# Patient Record
Sex: MALE | Race: WHITE | Employment: OTHER | URBAN - METROPOLITAN AREA
[De-identification: names, ages, dates, MRNs, and addresses within clinical notes are randomized per-mention and may not be internally consistent; named-entity substitution may affect disease eponyms.]

---

## 2018-09-05 ENCOUNTER — HOSPITAL ENCOUNTER (OUTPATIENT)
Dept: NON INVASIVE DIAGNOSTICS | Age: 58
Discharge: HOME | End: 2018-09-05
Payer: COMMERCIAL

## 2018-09-05 LAB
EKG ATRIAL RATE: 62 BPM
EKG P AXIS: 27 DEGREES
EKG P-R INTERVAL: 120 MS
EKG Q-T INTERVAL: 414 MS
EKG QRS DURATION: 88 MS
EKG QTC CALCULATION (BAZETT): 420 MS
EKG R AXIS: 73 DEGREES
EKG T AXIS: 67 DEGREES
EKG VENTRICULAR RATE: 62 BPM

## 2018-09-05 PROCEDURE — 93005 ELECTROCARDIOGRAM TRACING: CPT

## 2018-09-06 PROCEDURE — 93010 ELECTROCARDIOGRAM REPORT: CPT | Performed by: INTERNAL MEDICINE

## 2019-06-12 ENCOUNTER — HOSPITAL ENCOUNTER (OUTPATIENT)
Dept: NON INVASIVE DIAGNOSTICS | Age: 59
Discharge: HOME | End: 2019-06-12
Payer: COMMERCIAL

## 2019-06-12 LAB
EKG ATRIAL RATE: 65 BPM
EKG P AXIS: 79 DEGREES
EKG P-R INTERVAL: 132 MS
EKG Q-T INTERVAL: 444 MS
EKG QRS DURATION: 92 MS
EKG QTC CALCULATION (BAZETT): 461 MS
EKG R AXIS: 74 DEGREES
EKG T AXIS: 71 DEGREES
EKG VENTRICULAR RATE: 65 BPM

## 2019-06-12 PROCEDURE — 93005 ELECTROCARDIOGRAM TRACING: CPT | Performed by: NURSE PRACTITIONER

## 2019-06-14 PROCEDURE — 93010 ELECTROCARDIOGRAM REPORT: CPT | Performed by: INTERNAL MEDICINE

## 2020-04-22 ENCOUNTER — HOSPITAL ENCOUNTER (OUTPATIENT)
Dept: GENERAL RADIOLOGY | Age: 60
Discharge: HOME | End: 2020-04-24
Payer: COMMERCIAL

## 2020-04-22 ENCOUNTER — HOSPITAL ENCOUNTER (OUTPATIENT)
Dept: ULTRASOUND IMAGING | Age: 60
Discharge: HOME | End: 2020-04-24
Payer: COMMERCIAL

## 2020-04-22 PROCEDURE — 71046 X-RAY EXAM CHEST 2 VIEWS: CPT

## 2020-04-22 PROCEDURE — 76999 ECHO EXAMINATION PROCEDURE: CPT

## 2020-05-04 ENCOUNTER — HOSPITAL ENCOUNTER (EMERGENCY)
Age: 60
Discharge: HOME | End: 2020-05-04
Payer: COMMERCIAL

## 2020-05-04 VITALS
RESPIRATION RATE: 16 BRPM | DIASTOLIC BLOOD PRESSURE: 84 MMHG | WEIGHT: 103 LBS | TEMPERATURE: 98.3 F | BODY MASS INDEX: 16.55 KG/M2 | OXYGEN SATURATION: 100 % | SYSTOLIC BLOOD PRESSURE: 120 MMHG | HEIGHT: 66 IN | HEART RATE: 80 BPM

## 2020-05-04 PROCEDURE — 6370000000 HC RX 637 (ALT 250 FOR IP): Performed by: PHYSICIAN ASSISTANT

## 2020-05-04 PROCEDURE — 2580000003 HC RX 258: Performed by: PHYSICIAN ASSISTANT

## 2020-05-04 PROCEDURE — 2500000003 HC RX 250 WO HCPCS: Performed by: PHYSICIAN ASSISTANT

## 2020-05-04 PROCEDURE — 10060 I&D ABSCESS SIMPLE/SINGLE: CPT

## 2020-05-04 PROCEDURE — 99282 EMERGENCY DEPT VISIT SF MDM: CPT

## 2020-05-04 RX ORDER — LIDOCAINE HYDROCHLORIDE 10 MG/ML
5 INJECTION, SOLUTION EPIDURAL; INFILTRATION; INTRACAUDAL; PERINEURAL ONCE
Status: COMPLETED | OUTPATIENT
Start: 2020-05-04 | End: 2020-05-04

## 2020-05-04 RX ORDER — CEPHALEXIN 500 MG/1
500 CAPSULE ORAL 3 TIMES DAILY
Qty: 30 CAPSULE | Refills: 0 | Status: ON HOLD | OUTPATIENT
Start: 2020-05-04 | End: 2020-06-02 | Stop reason: HOSPADM

## 2020-05-04 RX ORDER — ACETAMINOPHEN 500 MG
500 TABLET ORAL EVERY 6 HOURS PRN
Qty: 20 TABLET | Refills: 0 | Status: SHIPPED | OUTPATIENT
Start: 2020-05-04

## 2020-05-04 RX ORDER — MAGNESIUM HYDROXIDE 1200 MG/15ML
250 LIQUID ORAL CONTINUOUS
Status: DISCONTINUED | OUTPATIENT
Start: 2020-05-04 | End: 2020-05-04 | Stop reason: HOSPADM

## 2020-05-04 RX ORDER — ACETAMINOPHEN 325 MG/1
650 TABLET ORAL ONCE
Status: COMPLETED | OUTPATIENT
Start: 2020-05-04 | End: 2020-05-04

## 2020-05-04 RX ORDER — CEPHALEXIN 500 MG/1
500 CAPSULE ORAL ONCE
Status: COMPLETED | OUTPATIENT
Start: 2020-05-04 | End: 2020-05-04

## 2020-05-04 RX ADMIN — LIDOCAINE HYDROCHLORIDE 5 ML: 10 INJECTION, SOLUTION EPIDURAL; INFILTRATION; INTRACAUDAL; PERINEURAL at 13:41

## 2020-05-04 RX ADMIN — ACETAMINOPHEN 650 MG: 325 TABLET ORAL at 13:40

## 2020-05-04 RX ADMIN — SODIUM CHLORIDE 250 ML: 900 IRRIGANT IRRIGATION at 13:41

## 2020-05-04 RX ADMIN — CEPHALEXIN 500 MG: 500 CAPSULE ORAL at 13:40

## 2020-05-04 ASSESSMENT — PAIN DESCRIPTION - FREQUENCY: FREQUENCY: CONTINUOUS

## 2020-05-04 ASSESSMENT — PAIN DESCRIPTION - ORIENTATION: ORIENTATION: RIGHT

## 2020-05-04 ASSESSMENT — ENCOUNTER SYMPTOMS
COLOR CHANGE: 1
RESPIRATORY NEGATIVE: 1
EYES NEGATIVE: 1
GASTROINTESTINAL NEGATIVE: 1
ROS SKIN COMMENTS: SKIN LESION

## 2020-05-04 ASSESSMENT — PAIN DESCRIPTION - LOCATION: LOCATION: ABDOMEN

## 2020-05-04 ASSESSMENT — PAIN DESCRIPTION - ONSET: ONSET: PROGRESSIVE

## 2020-05-04 ASSESSMENT — PAIN DESCRIPTION - PAIN TYPE: TYPE: ACUTE PAIN

## 2020-05-04 ASSESSMENT — PAIN DESCRIPTION - DESCRIPTORS: DESCRIPTORS: TENDER

## 2020-05-04 ASSESSMENT — PAIN SCALES - GENERAL: PAINLEVEL_OUTOF10: 4

## 2020-05-04 NOTE — ED NOTES
Abscess cleansed with chlorhexadine and band aid applied. Pt tolerated well. Pt and patient's  advised of patient's medications that need picked up and given discharge instructions. Verbalized understanding. Ambulatory out of er with steady gait.       Maritza Royal RN  05/04/20 8533

## 2020-05-15 ENCOUNTER — OFFICE VISIT (OUTPATIENT)
Dept: SURGERY | Age: 60
End: 2020-05-15
Payer: COMMERCIAL

## 2020-05-15 VITALS
HEIGHT: 66 IN | WEIGHT: 101 LBS | TEMPERATURE: 97.1 F | DIASTOLIC BLOOD PRESSURE: 70 MMHG | BODY MASS INDEX: 16.23 KG/M2 | SYSTOLIC BLOOD PRESSURE: 110 MMHG

## 2020-05-15 PROCEDURE — G8427 DOCREV CUR MEDS BY ELIG CLIN: HCPCS | Performed by: SURGERY

## 2020-05-15 PROCEDURE — G8419 CALC BMI OUT NRM PARAM NOF/U: HCPCS | Performed by: SURGERY

## 2020-05-15 PROCEDURE — 10061 I&D ABSCESS COMP/MULTIPLE: CPT | Performed by: SURGERY

## 2020-05-15 PROCEDURE — 4004F PT TOBACCO SCREEN RCVD TLK: CPT | Performed by: SURGERY

## 2020-05-15 PROCEDURE — 3017F COLORECTAL CA SCREEN DOC REV: CPT | Performed by: SURGERY

## 2020-05-15 PROCEDURE — 99202 OFFICE O/P NEW SF 15 MIN: CPT | Performed by: SURGERY

## 2020-05-15 RX ORDER — PHENYTOIN SODIUM 100 MG/1
CAPSULE, EXTENDED RELEASE ORAL
Status: ON HOLD | COMMUNITY
Start: 2020-03-13 | End: 2020-05-30 | Stop reason: HOSPADM

## 2020-05-15 RX ORDER — DIVALPROEX SODIUM 500 MG/1
TABLET, DELAYED RELEASE ORAL
Status: ON HOLD | COMMUNITY
Start: 2020-03-18 | End: 2020-05-30 | Stop reason: HOSPADM

## 2020-05-15 RX ORDER — DIPHENOXYLATE HYDROCHLORIDE AND ATROPINE SULFATE 2.5; .025 MG/1; MG/1
TABLET ORAL
Status: ON HOLD | COMMUNITY
Start: 2020-04-21 | End: 2020-05-30 | Stop reason: HOSPADM

## 2020-05-15 NOTE — PROGRESS NOTES
upper quadrant. Hernia: No hernia is present. Musculoskeletal:      Comments: Normal gait   Skin:     Findings: No bruising, lesion or rash. Neurological:      Mental Status: He is alert and oriented to person, place, and time. Psychiatric:         Mood and Affect: Mood normal.         Judgment: Judgment normal.       /70   Temp 97.1 °F (36.2 °C) (Temporal)   Ht 5' 6\" (1.676 m)   Wt 101 lb (45.8 kg)   BMI 16.30 kg/m²   Assessment:      Large abdominal wall infected cyst      Plan:      Incision and drainage of an abdominal wall cyst  Time out was called and the patient and procedure were identified. Options of therapy were discussed with the patient and Dejan Shook agrees to an incision and drainage. The procedure as well as risks and complications including but not exclusive to blood loss and recurrent infection, even requiring further surgery were discussed and a consent was signed. The patient was in the supine position. The area was prepped and draped with betadine in a sterile fashion. The area was infiltrated with 2% lidocaine with epinepherine. A cruciate incision was made with a 15 knife blade and a large amount of  purulent material was drained. He still has a large residual cyst present. I tried to milk out is much of the cyst material as I could. A culture was obtained. The wound was packed with 1/4 in.nuguaze   DSD was applied. Patient tolerated procedure well with minimal blood loss. Pain level pre procedure was 6 and post procedure it was 2 Instructions were given to remove the packing in 1 days. The wound will be cleansed daily with water and a dressing applied. Return visit in 1 week.         Maria Luz Al MD

## 2020-05-16 PROBLEM — L72.3 INFECTED SEBACEOUS CYST: Status: ACTIVE | Noted: 2020-05-16

## 2020-05-16 PROBLEM — L08.9 INFECTED SEBACEOUS CYST: Status: ACTIVE | Noted: 2020-05-16

## 2020-05-16 ASSESSMENT — ENCOUNTER SYMPTOMS
EYES NEGATIVE: 1
CHEST TIGHTNESS: 0
ALLERGIC/IMMUNOLOGIC NEGATIVE: 1
CONSTIPATION: 0
RHINORRHEA: 0
SHORTNESS OF BREATH: 0
ABDOMINAL PAIN: 0
ABDOMINAL DISTENTION: 0
BLOOD IN STOOL: 0
COLOR CHANGE: 0

## 2020-05-17 NOTE — PATIENT INSTRUCTIONS
Remove the packing in 24 hours after wetting the incision in the shower or tub  Cleanse daily in the shower or tub and apply dry sterile dressing or band aid. For pain take Tylenol or ibuprofen 1 to 2 tablets every 6 hours as needed.

## 2020-05-18 LAB
GRAM STAIN RESULT: NORMAL
WOUND/ABSCESS: NORMAL

## 2020-05-21 ENCOUNTER — OFFICE VISIT (OUTPATIENT)
Dept: SURGERY | Age: 60
End: 2020-05-21

## 2020-05-21 VITALS
WEIGHT: 93.6 LBS | BODY MASS INDEX: 15.6 KG/M2 | HEIGHT: 65 IN | SYSTOLIC BLOOD PRESSURE: 136 MMHG | TEMPERATURE: 97.8 F | DIASTOLIC BLOOD PRESSURE: 78 MMHG

## 2020-05-21 PROCEDURE — 99024 POSTOP FOLLOW-UP VISIT: CPT | Performed by: SURGERY

## 2020-05-21 RX ORDER — SULFAMETHOXAZOLE AND TRIMETHOPRIM 800; 160 MG/1; MG/1
1 TABLET ORAL 2 TIMES DAILY
Qty: 28 TABLET | Refills: 1 | Status: ON HOLD | OUTPATIENT
Start: 2020-05-21 | End: 2020-05-30 | Stop reason: HOSPADM

## 2020-05-28 ENCOUNTER — HOSPITAL ENCOUNTER (INPATIENT)
Age: 60
LOS: 2 days | Discharge: ANOTHER HOSPITAL | DRG: 750 | End: 2020-05-30
Attending: PSYCHIATRY & NEUROLOGY | Admitting: PSYCHIATRY & NEUROLOGY
Payer: COMMERCIAL

## 2020-05-28 PROBLEM — F25.9 SCHIZOAFFECTIVE DISORDER (HCC): Status: ACTIVE | Noted: 2020-05-28

## 2020-05-28 LAB
ACETAMINOPHEN LEVEL: <5 UG/ML (ref 10–30)
ALBUMIN SERPL-MCNC: 3.7 G/DL (ref 3.5–4.6)
ALP BLD-CCNC: 131 U/L (ref 35–104)
ALT SERPL-CCNC: 12 U/L (ref 0–41)
AMPHETAMINE SCREEN, URINE: NORMAL
ANION GAP SERPL CALCULATED.3IONS-SCNC: 10 MEQ/L (ref 9–15)
AST SERPL-CCNC: 11 U/L (ref 0–40)
BARBITURATE SCREEN URINE: NORMAL
BASOPHILS ABSOLUTE: 0.1 K/UL (ref 0–0.2)
BASOPHILS RELATIVE PERCENT: 0.8 %
BENZODIAZEPINE SCREEN, URINE: NORMAL
BILIRUB SERPL-MCNC: 0.3 MG/DL (ref 0.2–0.7)
BILIRUBIN URINE: ABNORMAL
BLOOD, URINE: NEGATIVE
BUN BLDV-MCNC: 14 MG/DL (ref 8–23)
CALCIUM SERPL-MCNC: 10.1 MG/DL (ref 8.5–9.9)
CANNABINOID SCREEN URINE: NORMAL
CHLORIDE BLD-SCNC: 100 MEQ/L (ref 95–107)
CLARITY: CLEAR
CO2: 27 MEQ/L (ref 20–31)
COCAINE METABOLITE SCREEN URINE: NORMAL
COLOR: ABNORMAL
CREAT SERPL-MCNC: 0.53 MG/DL (ref 0.7–1.2)
EKG ATRIAL RATE: 78 BPM
EKG P AXIS: 69 DEGREES
EKG P-R INTERVAL: 118 MS
EKG Q-T INTERVAL: 370 MS
EKG QRS DURATION: 86 MS
EKG QTC CALCULATION (BAZETT): 421 MS
EKG R AXIS: 73 DEGREES
EKG T AXIS: 74 DEGREES
EKG VENTRICULAR RATE: 78 BPM
EOSINOPHILS ABSOLUTE: 0.1 K/UL (ref 0–0.7)
EOSINOPHILS RELATIVE PERCENT: 0.5 %
ETHANOL PERCENT: NORMAL G/DL
ETHANOL: <10 MG/DL (ref 0–0.08)
GFR AFRICAN AMERICAN: >60
GFR NON-AFRICAN AMERICAN: >60
GLOBULIN: 3.7 G/DL (ref 2.3–3.5)
GLUCOSE BLD-MCNC: 101 MG/DL (ref 70–99)
GLUCOSE URINE: NEGATIVE MG/DL
HCT VFR BLD CALC: 36.3 % (ref 42–52)
HEMOGLOBIN: 12 G/DL (ref 14–18)
KETONES, URINE: NEGATIVE MG/DL
LEUKOCYTE ESTERASE, URINE: NEGATIVE
LYMPHOCYTES ABSOLUTE: 2 K/UL (ref 1–4.8)
LYMPHOCYTES RELATIVE PERCENT: 18.6 %
Lab: NORMAL
MAGNESIUM: 2.2 MG/DL (ref 1.7–2.4)
MCH RBC QN AUTO: 27.2 PG (ref 27–31.3)
MCHC RBC AUTO-ENTMCNC: 33.1 % (ref 33–37)
MCV RBC AUTO: 82.2 FL (ref 80–100)
METHADONE SCREEN, URINE: NORMAL
MONOCYTES ABSOLUTE: 1 K/UL (ref 0.2–0.8)
MONOCYTES RELATIVE PERCENT: 9 %
NEUTROPHILS ABSOLUTE: 7.7 K/UL (ref 1.4–6.5)
NEUTROPHILS RELATIVE PERCENT: 71.1 %
NITRITE, URINE: NEGATIVE
OPIATE SCREEN URINE: NORMAL
OXYCODONE URINE: NORMAL
PDW BLD-RTO: 13.5 % (ref 11.5–14.5)
PH UA: 6 (ref 5–9)
PHENCYCLIDINE SCREEN URINE: NORMAL
PHENYTOIN LEVEL: <0.8 UG/ML (ref 10–20)
PLATELET # BLD: 520 K/UL (ref 130–400)
POTASSIUM REFLEX MAGNESIUM: 4 MEQ/L (ref 3.4–4.9)
PROPOXYPHENE SCREEN: NORMAL
PROTEIN UA: NEGATIVE MG/DL
RBC # BLD: 4.42 M/UL (ref 4.7–6.1)
SALICYLATE, SERUM: <0.3 MG/DL (ref 15–30)
SARS-COV-2, NAAT: NOT DETECTED
SODIUM BLD-SCNC: 137 MEQ/L (ref 135–144)
SPECIFIC GRAVITY UA: 1.04 (ref 1–1.03)
TOTAL CK: 17 U/L (ref 0–190)
TOTAL PROTEIN: 7.4 G/DL (ref 6.3–8)
URINE REFLEX TO CULTURE: ABNORMAL
UROBILINOGEN, URINE: 4 E.U./DL
VALPROIC ACID LEVEL: <2.8 UG/ML (ref 50–100)
WBC # BLD: 10.8 K/UL (ref 4.8–10.8)

## 2020-05-28 PROCEDURE — G0480 DRUG TEST DEF 1-7 CLASSES: HCPCS

## 2020-05-28 PROCEDURE — 80307 DRUG TEST PRSMV CHEM ANLYZR: CPT

## 2020-05-28 PROCEDURE — 93005 ELECTROCARDIOGRAM TRACING: CPT | Performed by: PSYCHIATRY & NEUROLOGY

## 2020-05-28 PROCEDURE — 99285 EMERGENCY DEPT VISIT HI MDM: CPT

## 2020-05-28 PROCEDURE — 82550 ASSAY OF CK (CPK): CPT

## 2020-05-28 PROCEDURE — 83735 ASSAY OF MAGNESIUM: CPT

## 2020-05-28 PROCEDURE — 82378 CARCINOEMBRYONIC ANTIGEN: CPT

## 2020-05-28 PROCEDURE — 81003 URINALYSIS AUTO W/O SCOPE: CPT

## 2020-05-28 PROCEDURE — 85025 COMPLETE CBC W/AUTO DIFF WBC: CPT

## 2020-05-28 PROCEDURE — 36415 COLL VENOUS BLD VENIPUNCTURE: CPT

## 2020-05-28 PROCEDURE — 80164 ASSAY DIPROPYLACETIC ACD TOT: CPT

## 2020-05-28 PROCEDURE — 80185 ASSAY OF PHENYTOIN TOTAL: CPT

## 2020-05-28 PROCEDURE — U0002 COVID-19 LAB TEST NON-CDC: HCPCS

## 2020-05-28 PROCEDURE — 1240000000 HC EMOTIONAL WELLNESS R&B

## 2020-05-28 PROCEDURE — 80053 COMPREHEN METABOLIC PANEL: CPT

## 2020-05-28 RX ORDER — TRAZODONE HYDROCHLORIDE 50 MG/1
50 TABLET ORAL NIGHTLY PRN
Status: DISCONTINUED | OUTPATIENT
Start: 2020-05-28 | End: 2020-05-30 | Stop reason: HOSPADM

## 2020-05-28 RX ORDER — POLYETHYLENE GLYCOL 3350 17 G/17G
17 POWDER, FOR SOLUTION ORAL DAILY PRN
Status: DISCONTINUED | OUTPATIENT
Start: 2020-05-28 | End: 2020-05-30 | Stop reason: HOSPADM

## 2020-05-28 RX ORDER — DIVALPROEX SODIUM 500 MG/1
500 TABLET, DELAYED RELEASE ORAL DAILY
Status: DISCONTINUED | OUTPATIENT
Start: 2020-05-29 | End: 2020-05-30 | Stop reason: HOSPADM

## 2020-05-28 RX ORDER — HYDROXYZINE PAMOATE 50 MG/1
50 CAPSULE ORAL EVERY 6 HOURS PRN
Status: DISCONTINUED | OUTPATIENT
Start: 2020-05-28 | End: 2020-05-30 | Stop reason: HOSPADM

## 2020-05-28 RX ORDER — HALOPERIDOL 5 MG/ML
5 INJECTION INTRAMUSCULAR EVERY 6 HOURS PRN
Status: DISCONTINUED | OUTPATIENT
Start: 2020-05-28 | End: 2020-05-30 | Stop reason: HOSPADM

## 2020-05-28 RX ORDER — MULTIVITAMIN WITH IRON
1 TABLET ORAL DAILY
Status: DISCONTINUED | OUTPATIENT
Start: 2020-05-29 | End: 2020-05-30 | Stop reason: HOSPADM

## 2020-05-28 RX ORDER — FLUPHENAZINE DECANOATE 25 MG/ML
25 INJECTION, SOLUTION INTRAMUSCULAR; SUBCUTANEOUS
Status: ON HOLD | COMMUNITY
End: 2020-06-02 | Stop reason: HOSPADM

## 2020-05-28 RX ORDER — BENZTROPINE MESYLATE 1 MG/ML
2 INJECTION INTRAMUSCULAR; INTRAVENOUS 2 TIMES DAILY PRN
Status: DISCONTINUED | OUTPATIENT
Start: 2020-05-28 | End: 2020-05-30 | Stop reason: HOSPADM

## 2020-05-28 RX ORDER — HALOPERIDOL 5 MG
5 TABLET ORAL EVERY 6 HOURS PRN
Status: DISCONTINUED | OUTPATIENT
Start: 2020-05-28 | End: 2020-05-30 | Stop reason: HOSPADM

## 2020-05-28 RX ORDER — ACETAMINOPHEN 325 MG/1
650 TABLET ORAL EVERY 4 HOURS PRN
Status: DISCONTINUED | OUTPATIENT
Start: 2020-05-28 | End: 2020-05-30 | Stop reason: HOSPADM

## 2020-05-28 RX ORDER — HYDROXYZINE HYDROCHLORIDE 50 MG/ML
50 INJECTION, SOLUTION INTRAMUSCULAR EVERY 6 HOURS PRN
Status: DISCONTINUED | OUTPATIENT
Start: 2020-05-28 | End: 2020-05-30 | Stop reason: HOSPADM

## 2020-05-28 RX ORDER — ZIPRASIDONE HYDROCHLORIDE 20 MG/1
20 CAPSULE ORAL 2 TIMES DAILY WITH MEALS
Status: ON HOLD | COMMUNITY
End: 2020-06-02 | Stop reason: HOSPADM

## 2020-05-28 ASSESSMENT — ENCOUNTER SYMPTOMS
PHOTOPHOBIA: 0
DIARRHEA: 0
SORE THROAT: 0
EYE PAIN: 0
NAUSEA: 0
SHORTNESS OF BREATH: 0
RHINORRHEA: 0
VOMITING: 0
COUGH: 0
ABDOMINAL PAIN: 0
BACK PAIN: 0

## 2020-05-28 ASSESSMENT — PATIENT HEALTH QUESTIONNAIRE - PHQ9: SUM OF ALL RESPONSES TO PHQ QUESTIONS 1-9: 8

## 2020-05-28 ASSESSMENT — SLEEP AND FATIGUE QUESTIONNAIRES
DO YOU HAVE DIFFICULTY SLEEPING: NO
DO YOU USE A SLEEP AID: NO
SLEEP PATTERN: NORMAL

## 2020-05-28 NOTE — ED NOTES
Security at the bedside for belongings collection.  Pt given pudding and water with ok from ARCHIE Plascencia  05/28/20 9745

## 2020-05-28 NOTE — ED PROVIDER NOTES
OB/GYN light-headedness and headaches. Psychiatric/Behavioral: Negative. All other systems reviewed and are negative. Except as noted above the remainder of the review of systems was reviewed and negative.        PAST MEDICAL HISTORY     Past Medical History:   Diagnosis Date    [facility]     HTN (hypertension)     Hx of drug abuse ([facility]     cocaine, heroin, speed, THC    Osteoarthritis     Seizures ([facility]     Smoker      Past Surgical History:   Procedure Laterality Date    NOSE SURGERY      When was 10     Social History     Socioeconomic History    Marital status: Single     Spouse name: None    Number of children: None    Years of education: None    Highest education level: None   Occupational History    None   Social Needs    Financial resource strain: None    Food insecurity     Worry: None     Inability: None    Transportation needs     Medical: None     Non-medical: None   Tobacco Use    Smoking status: Current Every Day Smoker     Types: Cigarettes    Smokeless tobacco: Never Used   Substance and Sexual Activity    Alcohol use: None    Drug use: None    Sexual activity: None   Lifestyle    Physical activity     Days per week: None     Minutes per session: None    Stress: None   Relationships    Social connections     Talks on phone: None     Gets together: None     Attends Sikh service: None     Active member of club or organization: None     Attends meetings of clubs or organizations: None     Relationship status: None    Intimate partner violence     Fear of current or ex partner: None     Emotionally abused: None     Physically abused: None     Forced sexual activity: None   Other Topics Concern    None   Social History Narrative    None       SCREENINGS             PHYSICAL EXAM    (up to 7 for level 4, 8 or more for level 5)     ED Triage Vitals [05/28/20 1504]   BP Temp Temp Source Pulse Resp SpO2 Height Weight   121/81 97.9 °F (36.6 °C) Oral 75 12 98 % --

## 2020-05-28 NOTE — ED NOTES
Bed: 12  Expected date: 5/28/20  Expected time: 2:52 PM  Means of arrival: [facility]  Comments:  62 M - failure to thrive. Not eating or drinking. Weight loss. Pink slipped by Joao Oswald.  132/88,81,99%      Alicia Higginbotham, RN  05/28/20 1500

## 2020-05-29 ENCOUNTER — APPOINTMENT (OUTPATIENT)
Dept: CT IMAGING | Age: 60
DRG: 750 | End: 2020-05-29
Payer: COMMERCIAL

## 2020-05-29 PROBLEM — L72.3 INFLAMED EPIDERMOID CYST OF SKIN: Status: ACTIVE | Noted: 2020-05-29

## 2020-05-29 PROCEDURE — 93010 ELECTROCARDIOGRAM REPORT: CPT | Performed by: INTERNAL MEDICINE

## 2020-05-29 PROCEDURE — 87077 CULTURE AEROBIC IDENTIFY: CPT

## 2020-05-29 PROCEDURE — 99254 IP/OBS CNSLTJ NEW/EST MOD 60: CPT | Performed by: SURGERY

## 2020-05-29 PROCEDURE — 87205 SMEAR GRAM STAIN: CPT

## 2020-05-29 PROCEDURE — 6370000000 HC RX 637 (ALT 250 FOR IP): Performed by: PSYCHIATRY & NEUROLOGY

## 2020-05-29 PROCEDURE — 87070 CULTURE OTHR SPECIMN AEROBIC: CPT

## 2020-05-29 PROCEDURE — 87147 CULTURE TYPE IMMUNOLOGIC: CPT

## 2020-05-29 PROCEDURE — 87075 CULTR BACTERIA EXCEPT BLOOD: CPT

## 2020-05-29 PROCEDURE — 71260 CT THORAX DX C+: CPT

## 2020-05-29 PROCEDURE — 6360000004 HC RX CONTRAST MEDICATION: Performed by: NURSE PRACTITIONER

## 2020-05-29 PROCEDURE — 6370000000 HC RX 637 (ALT 250 FOR IP): Performed by: NURSE PRACTITIONER

## 2020-05-29 PROCEDURE — 87185 SC STD ENZYME DETCJ PER NZM: CPT

## 2020-05-29 PROCEDURE — 1240000000 HC EMOTIONAL WELLNESS R&B

## 2020-05-29 PROCEDURE — 99223 1ST HOSP IP/OBS HIGH 75: CPT | Performed by: PSYCHIATRY & NEUROLOGY

## 2020-05-29 PROCEDURE — 87186 SC STD MICRODIL/AGAR DIL: CPT

## 2020-05-29 RX ORDER — SULFAMETHOXAZOLE AND TRIMETHOPRIM 800; 160 MG/1; MG/1
1 TABLET ORAL EVERY 12 HOURS SCHEDULED
Status: DISCONTINUED | OUTPATIENT
Start: 2020-05-29 | End: 2020-05-30 | Stop reason: HOSPADM

## 2020-05-29 RX ORDER — PHENYTOIN SODIUM 100 MG/1
100 CAPSULE, EXTENDED RELEASE ORAL NIGHTLY
Status: DISCONTINUED | OUTPATIENT
Start: 2020-05-29 | End: 2020-05-30 | Stop reason: HOSPADM

## 2020-05-29 RX ORDER — MIRTAZAPINE 15 MG/1
15 TABLET, FILM COATED ORAL NIGHTLY
Status: DISCONTINUED | OUTPATIENT
Start: 2020-05-29 | End: 2020-05-30 | Stop reason: HOSPADM

## 2020-05-29 RX ORDER — PHENYTOIN SODIUM 100 MG/1
100 CAPSULE, EXTENDED RELEASE ORAL 2 TIMES DAILY
Status: DISCONTINUED | OUTPATIENT
Start: 2020-05-29 | End: 2020-05-29

## 2020-05-29 RX ORDER — PHENYTOIN SODIUM 100 MG/1
200 CAPSULE, EXTENDED RELEASE ORAL
Status: DISCONTINUED | OUTPATIENT
Start: 2020-05-29 | End: 2020-05-30 | Stop reason: HOSPADM

## 2020-05-29 RX ADMIN — SULFAMETHOXAZOLE AND TRIMETHOPRIM 1 TABLET: 800; 160 TABLET ORAL at 20:31

## 2020-05-29 RX ADMIN — IOPAMIDOL 75 ML: 612 INJECTION, SOLUTION INTRAVENOUS at 13:13

## 2020-05-29 RX ADMIN — DIVALPROEX SODIUM 500 MG: 500 TABLET, DELAYED RELEASE ORAL at 08:46

## 2020-05-29 RX ADMIN — PHENYTOIN SODIUM 200 MG: 100 CAPSULE ORAL at 10:22

## 2020-05-29 RX ADMIN — THERA TABS 1 TABLET: TAB at 08:46

## 2020-05-29 RX ADMIN — MIRTAZAPINE 15 MG: 15 TABLET, FILM COATED ORAL at 20:26

## 2020-05-29 RX ADMIN — SULFAMETHOXAZOLE AND TRIMETHOPRIM 1 TABLET: 800; 160 TABLET ORAL at 12:07

## 2020-05-29 RX ADMIN — PHENYTOIN SODIUM 100 MG: 100 CAPSULE ORAL at 20:26

## 2020-05-29 ASSESSMENT — LIFESTYLE VARIABLES: HISTORY_ALCOHOL_USE: NO

## 2020-05-29 NOTE — CARE COORDINATION
Patient did not attend group despite staff encouragement.   Electronically signed by Horacio Sharp on 5/29/2020 at 11:56 AM

## 2020-05-29 NOTE — CONSULTS
Pupils are equal, round, and reactive to light. Neck:      Comments: Neck is supple without any masses, no thyromegaly, trachea midline  Abdominal:      Palpations: There is no hepatomegaly or splenomegaly. Tenderness: There is minimal abdominal tenderness in the right upper quadrant. Hernia: No hernia is present. Musculoskeletal:      Comments: Normal gait   Skin:     Findings: No bruising, lesion or rash. Neurological:      Mental Status: He is alert and oriented to person, place, and time. Psychiatric:         Mood and Affect: Mood normal.         Judgment: Judgment normal.     LABS:  Recent Labs     05/28/20  1600   WBC 10.8   HGB 12.0*   HCT 36.3*   *      K 4.0      CO2 27   BUN 14   CREATININE 0.53*   MG 2.2   CALCIUM 10.1*   AST 11   ALT 12   BILITOT 0.3   NITRU Negative   COLORU DARK YELLOW*       Thank you for the interesting evaluation. Further recommendations to follow.     Electronically signed by Shane Shah MD on 5/29/20 at 12:50 PM EDT

## 2020-05-29 NOTE — PROGRESS NOTES
Patient arrived to unit via wheelchair accompanied by staff. Skin assessment and contraband check complete by this nurse and Barb Mccarty from [place]. Pt has abscess on abdomen that is open, but does not appear to be draining. Per Barb Mccarty, emergency room physician looked at abscess and would like hospitalist to assess on floor. Per chart, pt saw Dr. Naresh Corcoran on 5/21/2020 for abscess, had cultures done that showed no growth. No contraband found. Pt oriented to unit and assigned room. Pt given snacks/fluids. Vitals taken. Pt pleasant and bright.  Electronically signed by Abraham Reis RN on 5/28/20 at 9:29 PM EDT

## 2020-05-29 NOTE — ED NOTES
Consult with Dr Tracey Frankel. Patient to be admitted with a provisional diagnosis of schizoaffective with the following orders. [facility]  05/28/20 2043

## 2020-05-29 NOTE — H&P
[facility]ICINE    HISTORY AND PHYSICAL EXAM    PATIENT NAME:  Tho Padilla    MRN:  [id number]  SERVICE DATE:  5/29/2020   SERVICE TIME:  9:33 AM    Primary Care Physician: Virginia Aguila         SUBJECTIVE  CHIEF COMPLAINT:  Medical clear for inpatient psychiatry admission. Consult for medical H/P encounter. HPI:  This is a 62 y.o. male who admitted to  for failure to thrive d/t weight loss. Pt look severly malnourished and responds \" I don't know to most questions asked\". He has a flat effect but denies any depression or suicidal ideation. He admits to smoking 1 PPD but denies any drug or alcohol use. He admits to compliant with his medication, however his dilantin and valproic acid level very low, he denies any  recent seizure. Pt also s/p incision and driangae of abdominal wall abscess on 5/15/2020 performed by Dr Sherlie Phalen and prescribed 2 weeks of BActrim DS and f/u in 2 weeks. No growth noted from wound culture at that time. He denies any SOB, CP, N/V, fever, chills, abdominal pain,  constipation or diarrhea. PAST MEDICAL HISTORY:    Past Medical History:   Diagnosis Date    [facility]     HTN (hypertension)     Hx of drug abuse ([facility]     cocaine, heroin, speed, THC    Osteoarthritis     Seizures ([facility]     Smoker      PAST SURGICAL HISTORY:    Past Surgical History:   Procedure Laterality Date    NOSE SURGERY      When was 14     FAMILY HISTORY:  History reviewed. No pertinent family history.   SOCIAL HISTORY:    Social History     Socioeconomic History    Marital status: Single     Spouse name: Not on file    Number of children: Not on file    Years of education: Not on file    Highest education level: Not on file   Occupational History    Not on file   Social Needs    Financial resource strain: Not on file    Food insecurity     Worry: Not on file     Inability: Not on file    Transportation needs     Medical: Not on file     Non-medical: Not on file Oral Q6H PRN Justyna Salmeron MD           ALLERGIES: Tegretol [carbamazepine]    REVIEW OF SYSTEM:   ROS as noted in HPI, 12 point ROS reviewed and otherwise negative. OBJECTIVE  PHYSICAL EXAM: BP (!) 125/92   Pulse 87   Temp 98 °F (36.7 °C) (Oral)   Resp 19   Ht 5' 5\" (1.651 m)   Wt 87 lb 9.6 oz (39.7 kg)   SpO2 93%   BMI 14.58 kg/m²   CONSTITUTIONAL:  Cachexia  EYES:  Lids and lashes normal, pupils equal, round and reactive to light, extra ocular muscles intact, sclera clear, conjunctiva normal  ENT:  Normocephalic, without obvious abnormality, atraumatic, sinuses nontender on palpation, external ears without lesions, oral pharynx with moist mucus membranes, tonsils without erythema or exudates, gums normal and good dentition. NECK:  Supple, symmetrical, trachea midline, no adenopathy, thyroid symmetric, not enlarged and no tenderness, skin normal  LUNGS:  No increased work of breathing, good air exchange, clear to auscultation bilaterally, no crackles or wheezing  CARDIOVASCULAR:  Normal apical impulse, regular rate and rhythm, normal S1 and S2, no S3 or S4, and no murmur noted  ABDOMEN:~3cm abdominal wall open protruding cyst with erythema and scant serous drainge  MUSCULOSKELETAL:  There is no redness, warmth, or swelling of the joints. NEUROLOGIC:  Awake, alert, oriented to name, place and time,generalized weakness  SKIN:  no bruising or bleeding, normal skin color,     DATA:     Diagnostic tests reviewed for today's visit:    Most recent labs and imaging results reviewed.          ASSESSMENT AND PLAN:  Schizoaffective disorder [facility]): continue current medication and management by psychiatrist     Abdominal Wall abscess with open cyst :s/p  I&D, repeat wound culture, start on Bactrim DS and consult general surgery and wound care for mgmt     Hx Seizure: resume dilantin and monitor level , monitor for any seizure activity     Malnutrition and failure to thrive in the elderly: Obtain CT chest to r/o malignancy as Chest Xray 4/22/2020) concerning for lung mass , pt is a daily smoker with concerning weight loss , start nutriotional supplement     Hx HTN: b/p seem controlled at this time, pt states he takes no antihypertensives, will monitor b/p for now start antihypertensives if any elevation. Tobacco Abuse : cessation strongly encouraged, pt refuse any intervention at this time. VTE Prophylaxis: low risk, pt ambulatory   Code status: full    This is only a history and physical examination and not medical management. The patient is to contact and follow up with their primary care physician and go over any abnormal labs, imaging, findings, medical concerns, or conditions that we have and have not addressed during this encounter.     Plan of care discussed with: patient     SIGNATURE: SYEDA Garvin CNP  DATE: May 29, 2020  TIME: 9:33 AM

## 2020-05-29 NOTE — H&P
illness. Medications Prior to Admission:   Medications Prior to Admission: fluPHENAZine decanoate (PROLIXIN) 25 MG/ML injection, Inject 25 mg into the muscle every 14 days  ziprasidone (GEODON) 20 MG capsule, Take 20 mg by mouth 2 times daily (with meals)  sulfamethoxazole-trimethoprim (BACTRIM DS) 800-160 MG per tablet, Take 1 tablet by mouth 2 times daily for 14 days  Multiple Vitamin (MULTI-VITAMINS) TABS,   cephALEXin (KEFLEX) 500 MG capsule, Take 1 capsule by mouth 3 times daily  acetaminophen (APAP EXTRA STRENGTH) 500 MG tablet, Take 1 tablet by mouth every 6 hours as needed for Pain  divalproex (DEPAKOTE) 500 MG DR tablet,   phenytoin (DILANTIN) 100 MG ER capsule,     Compliance:yes    Psychiatric Review of Systems       Depression: yes     Mariann or Hypomania:  no     Panic Attacks:  no     Phobias:  no     Obsessions and Compulsions:  no     PTSD : no     Hallucinations:  no     Delusions:  no    Substance Abuse History:  ETOH: no   Marijuana: no  Opiates: no  Other Drugs: no      Past Psychiatric History:  Prior Diagnosis:  Schizoaff disorder  Psychiatrist: lars  Therapist:lars  Hospitalization: yes  Hx of Suicidal Attempts: no  Hx of violence:  no  ECT: no  Previous discontinued Psychiatric Med Trials:     Past Medical History:        Diagnosis Date    B[facility] affective [facility]     HTN (hypertension)     Hx of drug abuse ([facility])     cocaine, heroin, speed, THC    Osteoarthritis     Seizures ([facility]     Smoker        Past Surgical History:        Procedure Laterality Date    NOSE SURGERY      When was 14       Allergies:   Tegretol [carbamazepine]    Family History  History reviewed. No pertinent family history.       Social History:  Born [place] Raised[place]  Describes Childhood:   supportive  Education: Grade School  Employment: Disabled  Relationships: single  Children: no children  Current Support: extended family    Legal Hx: none  Access to weapons?:  No      EXAMINATION:    REVIEW OF SYSTEMS:    ROS:  [x] All negative/unchanged except if checked.  Explain positive(checked items) below:  [] Constitutional  [] Eyes  [] Ear/Nose/Mouth/Throat  [] Respiratory  [] CV  [] GI  []   [] Musculoskeletal  [] Skin/Breast  [] Neurological  [] Endocrine  [] Heme/Lymph  [] Allergic/Immunologic    Explanation:     Vitals:  BP (!) 125/92   Pulse 87   Temp 98 °F (36.7 °C) (Oral)   Resp 19   Ht 5' 5\" (1.651 m)   Wt 87 lb 9.6 oz (39.7 kg)   SpO2 93%   BMI 14.58 kg/m²      Neurologic Exam:   Muscle Strength & Tone: full ROM  Gait: normal gait   Involuntary Movements: No    Mental Status Examination:    Level of consciousness:  within normal limits   Appearance:  ill-appearing  Behavior/Motor:  psychomotor retardation  Attitude toward examiner:  withdrawn  Speech:  slow   Mood: decreased range and depressed  Affect:  blunted  Thought processes:  slow   Thought content:  Suicidal Ideation:  passive  Cognition:  oriented to person, place, and time   Concentration poor  Memory intact  Insight poor   Judgement poor   Fund of Knowledge limited    Mini Mental Status not completed because       DIAGNOSIS:     Schizoaffective disorder bipolar severe depression      RISK ASSESSMENT:    SUICIDE RISK ASSESSMENT: high  HOMICIDE: low  AGITATION/VIOLENCE: low  ELOPEMENT: low    LABS: REVIEWED TODAY:  Recent Labs     05/28/20  1600   WBC 10.8   HGB 12.0*   *     Recent Labs     05/28/20  1600      K 4.0      CO2 27   BUN 14   CREATININE 0.53*   GLUCOSE 101*     Recent Labs     05/28/20  1600   BILITOT 0.3   ALKPHOS 131*   AST 11   ALT 12     Lab Results   Component Value Date    LABAMPH Neg 05/28/2020    BARBSCNU Neg 05/28/2020    LABBENZ Neg 05/28/2020    LABMETH Neg 05/28/2020    OPIATESCREENURINE Neg 05/28/2020    PHENCYCLIDINESCREENURINE Neg 05/28/2020    ETOH <10 05/28/2020     Lab Results   Component Value Date    TSH 1.270 04/22/2020     No results found for: LITHIUM  Lab Results   Component Value Date    VALPROATE <2.8 (L) 05/28/2020     Lab Results   Component Value Date    VALPROATE <2.8 05/28/2020       FURTHER LABS ORDERED :      Radiology   No results found. EKG: TRACING REVIEWED    TREATMENT PLAN:    Risk Management:  suicide risk    Collateral Information:  Will obtain collateral information from the family or friends. Will obtain medical records as appropriate from out patient providers  Will consult the hospitalist for a physical exam to rule out any co-morbid physical condition. Home medication Reconciled       New Medications started during this admission :    See orders  Prn Haldol 5mg and Vistaril 50mg q6hr for extreme agitation. Trazodone as ordered for insomnia  Vistaril as ordered for anxiety  Discussed with the patient risk, benefit, alternative and common side effects for the  proposed medication treatment. Patient is consenting to the treatment. Psychotherapy:   Encourage participation in milieu and group therapy  Individual therapy as needed        Behavioral Services  Medicare Certification      Admission Day 1  I certify that this patient's inpatient psychiatric hospital admission is medically necessary for:     (1) treatment which could reasonably be expected to improve this patient's condition, or     (2) diagnostic study or its equivalent.        Electronically signed by Nitin Steen MD on 5/29/2020 at 9:35 AM

## 2020-05-29 NOTE — PROGRESS NOTES
Pt. declined to attend the 0900 community meeting, despite staff encouragement. Electronically signed by Ninoska Rivera, [address] on 5/29/2020 at 9:26 AM

## 2020-05-29 NOTE — PROGRESS NOTES
Patient declined to attend the 1900 Activity Group despite staff encouragement.  Electronically signed by Michael Ordoñez on 5/29/2020 at 7:18 PM

## 2020-05-29 NOTE — CARE COORDINATION
Brief Intervention and Referral to Treatment Summary    Patient was provided PHQ-9, AUDIT and DAST Screening:      PHQ-9 Score: 8 (mild depression)  AUDIT Score:  0  DAST Score:  0    Patients substance use is considered     Low Risk/Healthy X  Moderate Risk  Harmful  Dependent    The patient has a history of substance abuse, but none currently. The patient's ethanol level and tox screens were both negative.       Patients depression is considered:     Minimal  Mild X   Moderate  Moderately Severe  Severe    Brief Education Was Provided    Patient was receptive   Patient was not receptive X      Brief Intervention Is Provided (Only for AUDIT or DAST)     Patient reports readiness to decrease and/or stop use and a plan was discussed   Patient denies readiness to decrease and/or stop use and a plan was not discussed    N/A. The patient has a history of substance abuse, but none currently. The patient's ethanol level and tox screens were both negative. Recommendations/Referrals for Brief and/or Specialized Treatment Provided to Patient   Medication maintenance and continue with linkage to the  [facility].

## 2020-05-29 NOTE — PROGRESS NOTES
Patient declined to participated in the [address] [facility] despite staff encouragement.  Electronically signed by Ramon Galan on 5/29/2020 at 5:08 PM

## 2020-05-29 NOTE — PROGRESS NOTES
Pt. refused to attend the 1000 skills group, despite staff encouragement. Electronically signed by Chandrakant Mccullough, [address] on 5/29/2020 at 11:08 AM

## 2020-05-29 NOTE — PROGRESS NOTES
[facility]. Patient presents to the ER pink slipped by Soledad Sevilla for failure to thrive. Patient denies SI/HI/AVH. He reports medication non compliance for about two months other then his injection from Soledad Sevilla. Over the last three weeks his sister, Nael Peguero, has noted during well checks that he has not had any food at home and has only been drinking energy drinks. She has been bringing him meals once a day, and patient eats at those times. He reports feeling overwhelmed with caring for himself, decreased motivation and energy, feeling hopeless and helpless. He has had a 30 pound weight loss over three months. He reported to Soledad Sevilla that he has a depressed mood and \"I don't know what to do with myself\". He otherwise has poverty of content and often shrugs shoulders and has evasive eye contact during assessment. He is unable to state the last time he had a seizure other then he used to have them all the time. He is calm but guarded. He contracts for safety while in the hospital.     Provisional dx of schizoaffective d/o. Dr. Milagros Garcia, involuntary admission.

## 2020-05-30 ENCOUNTER — APPOINTMENT (OUTPATIENT)
Dept: CT IMAGING | Age: 60
DRG: 136 | End: 2020-05-30
Attending: INTERNAL MEDICINE
Payer: COMMERCIAL

## 2020-05-30 ENCOUNTER — HOSPITAL ENCOUNTER (INPATIENT)
Age: 60
LOS: 5 days | Discharge: NURSING, REHAB OR LONG-TERM CARE | DRG: 136 | End: 2020-06-04
Attending: INTERNAL MEDICINE | Admitting: INTERNAL MEDICINE
Payer: COMMERCIAL

## 2020-05-30 VITALS
HEIGHT: 65 IN | DIASTOLIC BLOOD PRESSURE: 67 MMHG | SYSTOLIC BLOOD PRESSURE: 101 MMHG | RESPIRATION RATE: 14 BRPM | HEART RATE: 80 BPM | WEIGHT: 92.1 LBS | TEMPERATURE: 98 F | OXYGEN SATURATION: 97 % | BODY MASS INDEX: 15.35 KG/M2

## 2020-05-30 LAB
ALBUMIN SERPL-MCNC: 3.2 G/DL (ref 3.5–4.6)
ALP BLD-CCNC: 132 U/L (ref 35–104)
ALT SERPL-CCNC: 14 U/L (ref 0–41)
ANION GAP SERPL CALCULATED.3IONS-SCNC: 16 MEQ/L (ref 9–15)
AST SERPL-CCNC: 14 U/L (ref 0–40)
BASOPHILS ABSOLUTE: 0.1 K/UL (ref 0–0.2)
BASOPHILS RELATIVE PERCENT: 0.7 %
BILIRUB SERPL-MCNC: <0.2 MG/DL (ref 0.2–0.7)
BILIRUBIN DIRECT: <0.2 MG/DL (ref 0–0.4)
BILIRUBIN, INDIRECT: ABNORMAL MG/DL (ref 0–0.6)
BUN BLDV-MCNC: 16 MG/DL (ref 8–23)
CALCIUM SERPL-MCNC: 10.1 MG/DL (ref 8.5–9.9)
CEA: 50.1 NG/ML (ref 0–5.5)
CHLORIDE BLD-SCNC: 98 MEQ/L (ref 95–107)
CO2: 22 MEQ/L (ref 20–31)
CREAT SERPL-MCNC: 0.57 MG/DL (ref 0.7–1.2)
EOSINOPHILS ABSOLUTE: 0.1 K/UL (ref 0–0.7)
EOSINOPHILS RELATIVE PERCENT: 1 %
GFR AFRICAN AMERICAN: >60
GFR NON-AFRICAN AMERICAN: >60
GLUCOSE BLD-MCNC: 113 MG/DL (ref 70–99)
HCT VFR BLD CALC: 32.6 % (ref 42–52)
HEMOGLOBIN: 10.6 G/DL (ref 14–18)
LYMPHOCYTES ABSOLUTE: 2.5 K/UL (ref 1–4.8)
LYMPHOCYTES RELATIVE PERCENT: 21 %
MCH RBC QN AUTO: 27.2 PG (ref 27–31.3)
MCHC RBC AUTO-ENTMCNC: 32.6 % (ref 33–37)
MCV RBC AUTO: 83.3 FL (ref 80–100)
MONOCYTES ABSOLUTE: 1 K/UL (ref 0.2–0.8)
MONOCYTES RELATIVE PERCENT: 8.7 %
NEUTROPHILS ABSOLUTE: 8.1 K/UL (ref 1.4–6.5)
NEUTROPHILS RELATIVE PERCENT: 68.6 %
PDW BLD-RTO: 13.9 % (ref 11.5–14.5)
PLATELET # BLD: 502 K/UL (ref 130–400)
POTASSIUM SERPL-SCNC: 4.3 MEQ/L (ref 3.4–4.9)
RBC # BLD: 3.92 M/UL (ref 4.7–6.1)
SODIUM BLD-SCNC: 136 MEQ/L (ref 135–144)
TOTAL PROTEIN: 6.9 G/DL (ref 6.3–8)
WBC # BLD: 11.7 K/UL (ref 4.8–10.8)

## 2020-05-30 PROCEDURE — 6370000000 HC RX 637 (ALT 250 FOR IP): Performed by: NURSE PRACTITIONER

## 2020-05-30 PROCEDURE — 80076 HEPATIC FUNCTION PANEL: CPT

## 2020-05-30 PROCEDURE — 87040 BLOOD CULTURE FOR BACTERIA: CPT

## 2020-05-30 PROCEDURE — 1210000000 HC MED SURG R&B

## 2020-05-30 PROCEDURE — 99223 1ST HOSP IP/OBS HIGH 75: CPT | Performed by: INTERNAL MEDICINE

## 2020-05-30 PROCEDURE — 80048 BASIC METABOLIC PNL TOTAL CA: CPT

## 2020-05-30 PROCEDURE — 36415 COLL VENOUS BLD VENIPUNCTURE: CPT

## 2020-05-30 PROCEDURE — 85025 COMPLETE CBC W/AUTO DIFF WBC: CPT

## 2020-05-30 PROCEDURE — 6370000000 HC RX 637 (ALT 250 FOR IP): Performed by: PSYCHIATRY & NEUROLOGY

## 2020-05-30 RX ORDER — MIRTAZAPINE 15 MG/1
15 TABLET, FILM COATED ORAL NIGHTLY
Status: DISCONTINUED | OUTPATIENT
Start: 2020-05-30 | End: 2020-06-04 | Stop reason: HOSPADM

## 2020-05-30 RX ORDER — MULTIVITAMIN WITH IRON
1 TABLET ORAL DAILY
Qty: 1 TABLET | Refills: 0 | Status: ON HOLD | OUTPATIENT
Start: 2020-05-31 | End: 2020-06-02 | Stop reason: HOSPADM

## 2020-05-30 RX ORDER — ACETAMINOPHEN 325 MG/1
650 TABLET ORAL EVERY 4 HOURS PRN
Status: DISCONTINUED | OUTPATIENT
Start: 2020-05-30 | End: 2020-06-04 | Stop reason: HOSPADM

## 2020-05-30 RX ORDER — HYDROXYZINE HYDROCHLORIDE 50 MG/ML
50 INJECTION, SOLUTION INTRAMUSCULAR EVERY 6 HOURS PRN
Status: CANCELLED | OUTPATIENT
Start: 2020-05-30

## 2020-05-30 RX ORDER — ACETAMINOPHEN 325 MG/1
650 TABLET ORAL EVERY 4 HOURS PRN
Status: CANCELLED | OUTPATIENT
Start: 2020-05-30

## 2020-05-30 RX ORDER — TRAZODONE HYDROCHLORIDE 50 MG/1
50 TABLET ORAL NIGHTLY PRN
Status: CANCELLED | OUTPATIENT
Start: 2020-05-30

## 2020-05-30 RX ORDER — HALOPERIDOL 5 MG/ML
5 INJECTION INTRAMUSCULAR EVERY 6 HOURS PRN
Status: DISCONTINUED | OUTPATIENT
Start: 2020-05-30 | End: 2020-06-04 | Stop reason: HOSPADM

## 2020-05-30 RX ORDER — PHENYTOIN SODIUM 100 MG/1
100 CAPSULE, EXTENDED RELEASE ORAL NIGHTLY
Status: CANCELLED | OUTPATIENT
Start: 2020-05-30

## 2020-05-30 RX ORDER — PHENYTOIN SODIUM 100 MG/1
100 CAPSULE, EXTENDED RELEASE ORAL NIGHTLY
Qty: 60 CAPSULE | Refills: 3 | Status: SHIPPED | OUTPATIENT
Start: 2020-05-30

## 2020-05-30 RX ORDER — MIRTAZAPINE 15 MG/1
15 TABLET, FILM COATED ORAL NIGHTLY
Status: CANCELLED | OUTPATIENT
Start: 2020-05-30

## 2020-05-30 RX ORDER — HALOPERIDOL 5 MG/ML
5 INJECTION INTRAMUSCULAR EVERY 6 HOURS PRN
Status: CANCELLED | OUTPATIENT
Start: 2020-05-30

## 2020-05-30 RX ORDER — DIVALPROEX SODIUM 500 MG/1
500 TABLET, DELAYED RELEASE ORAL DAILY
Qty: 90 TABLET | Refills: 3 | Status: SHIPPED | OUTPATIENT
Start: 2020-05-31

## 2020-05-30 RX ORDER — MULTIVITAMIN WITH IRON
1 TABLET ORAL DAILY
Status: CANCELLED | OUTPATIENT
Start: 2020-05-31

## 2020-05-30 RX ORDER — DIVALPROEX SODIUM 500 MG/1
500 TABLET, DELAYED RELEASE ORAL DAILY
Status: CANCELLED | OUTPATIENT
Start: 2020-05-31

## 2020-05-30 RX ORDER — MULTIVITAMIN WITH IRON
1 TABLET ORAL DAILY
Status: DISCONTINUED | OUTPATIENT
Start: 2020-05-31 | End: 2020-06-04 | Stop reason: HOSPADM

## 2020-05-30 RX ORDER — PHENYTOIN SODIUM 100 MG/1
200 CAPSULE, EXTENDED RELEASE ORAL
Status: CANCELLED | OUTPATIENT
Start: 2020-05-31

## 2020-05-30 RX ORDER — TRAZODONE HYDROCHLORIDE 50 MG/1
50 TABLET ORAL NIGHTLY PRN
Status: DISCONTINUED | OUTPATIENT
Start: 2020-05-30 | End: 2020-06-04 | Stop reason: HOSPADM

## 2020-05-30 RX ORDER — HYDROXYZINE PAMOATE 25 MG/1
50 CAPSULE ORAL EVERY 6 HOURS PRN
Status: DISCONTINUED | OUTPATIENT
Start: 2020-05-30 | End: 2020-06-04 | Stop reason: HOSPADM

## 2020-05-30 RX ORDER — HYDROXYZINE PAMOATE 50 MG/1
50 CAPSULE ORAL EVERY 6 HOURS PRN
Status: CANCELLED | OUTPATIENT
Start: 2020-05-30

## 2020-05-30 RX ORDER — MIRTAZAPINE 15 MG/1
15 TABLET, FILM COATED ORAL NIGHTLY
Qty: 30 TABLET | Refills: 3 | Status: SHIPPED | OUTPATIENT
Start: 2020-05-30

## 2020-05-30 RX ORDER — BENZTROPINE MESYLATE 1 MG/ML
2 INJECTION INTRAMUSCULAR; INTRAVENOUS 2 TIMES DAILY PRN
Status: CANCELLED | OUTPATIENT
Start: 2020-05-30

## 2020-05-30 RX ORDER — PHENYTOIN SODIUM 100 MG/1
200 CAPSULE, EXTENDED RELEASE ORAL
Status: DISCONTINUED | OUTPATIENT
Start: 2020-05-31 | End: 2020-06-02

## 2020-05-30 RX ORDER — DIVALPROEX SODIUM 500 MG/1
500 TABLET, DELAYED RELEASE ORAL DAILY
Status: DISCONTINUED | OUTPATIENT
Start: 2020-05-31 | End: 2020-06-04 | Stop reason: HOSPADM

## 2020-05-30 RX ORDER — PHENYTOIN SODIUM 200 MG/1
200 CAPSULE, EXTENDED RELEASE ORAL
Qty: 60 CAPSULE | Refills: 3 | Status: SHIPPED | OUTPATIENT
Start: 2020-05-31

## 2020-05-30 RX ORDER — HALOPERIDOL 5 MG
5 TABLET ORAL EVERY 6 HOURS PRN
Status: CANCELLED | OUTPATIENT
Start: 2020-05-30

## 2020-05-30 RX ORDER — BENZTROPINE MESYLATE 1 MG/ML
2 INJECTION INTRAMUSCULAR; INTRAVENOUS 2 TIMES DAILY PRN
Status: DISCONTINUED | OUTPATIENT
Start: 2020-05-30 | End: 2020-06-04 | Stop reason: HOSPADM

## 2020-05-30 RX ORDER — PHENYTOIN SODIUM 100 MG/1
100 CAPSULE, EXTENDED RELEASE ORAL NIGHTLY
Status: DISCONTINUED | OUTPATIENT
Start: 2020-05-30 | End: 2020-06-02

## 2020-05-30 RX ORDER — HYDROXYZINE HYDROCHLORIDE 50 MG/ML
50 INJECTION, SOLUTION INTRAMUSCULAR EVERY 6 HOURS PRN
Status: DISCONTINUED | OUTPATIENT
Start: 2020-05-30 | End: 2020-06-04 | Stop reason: HOSPADM

## 2020-05-30 RX ORDER — TRAZODONE HYDROCHLORIDE 50 MG/1
50 TABLET ORAL NIGHTLY PRN
Qty: 1 TABLET | Refills: 1 | Status: SHIPPED | OUTPATIENT
Start: 2020-05-30

## 2020-05-30 RX ORDER — HALOPERIDOL 5 MG
5 TABLET ORAL EVERY 6 HOURS PRN
Status: DISCONTINUED | OUTPATIENT
Start: 2020-05-30 | End: 2020-06-04 | Stop reason: HOSPADM

## 2020-05-30 RX ADMIN — ACETAMINOPHEN 650 MG: 325 TABLET ORAL at 09:26

## 2020-05-30 RX ADMIN — DIVALPROEX SODIUM 500 MG: 500 TABLET, DELAYED RELEASE ORAL at 09:25

## 2020-05-30 RX ADMIN — SULFAMETHOXAZOLE AND TRIMETHOPRIM 1 TABLET: 800; 160 TABLET ORAL at 09:25

## 2020-05-30 RX ADMIN — THERA TABS 1 TABLET: TAB at 09:25

## 2020-05-30 RX ADMIN — PHENYTOIN SODIUM 200 MG: 100 CAPSULE ORAL at 06:08

## 2020-05-30 ASSESSMENT — ENCOUNTER SYMPTOMS
GASTROINTESTINAL NEGATIVE: 1
RESPIRATORY NEGATIVE: 1
ALLERGIC/IMMUNOLOGIC NEGATIVE: 1
COLOR CHANGE: 1
EYES NEGATIVE: 1

## 2020-05-30 ASSESSMENT — PAIN SCALES - GENERAL
PAINLEVEL_OUTOF10: 0
PAINLEVEL_OUTOF10: 0
PAINLEVEL_OUTOF10: 3
PAINLEVEL_OUTOF10: 0
PAINLEVEL_OUTOF10: 0

## 2020-05-30 NOTE — CONSULTS
[unfilled]  Allergies   Allergen Reactions    Tegretol [Carbamazepine]         Review of Systems  All other systems are normal other than ones mentioned in HPI. PHYSICAL EXAMINATION:   VITAL SIGNS: /69   Pulse 73   Temp 98 °F (36.7 °C) (Oral)   Resp 14   Ht 5' 5\" (1.651 m)   Wt 92 lb 1.6 oz (41.8 kg)   SpO2 98%   BMI 15.33 kg/m²         GENERAL: In no acute distress, well- nourished, well- developed,alert and oriented to person place and time. SKIN: Warm and dry, withoutjaundice, ecchymoses, or petechiae. HEENT: Normocephalic, sclera anicteric, oral mucosa moist without lesion or exudate in the visible oral cavity or oropharynx, tongue mid-line with good mobility and no deviation with extension. NODES: No palpable adenopathy in the neck Levels I-V, bilateral   Supraclavicular fossae, axillary chains, or inguinal regions. LUNGS: Good inspiratory effort, no accessory muscle use, clear bilaterally, no focal wheeze, rales or rhonchi. CARDIAC: Regular rate and rhythm, without murmurs, rubs or gallops. ABDOMINAL: Normal bowel soundspresent, soft, non-tender, no mass or  Organomegaly. Large wound in mid abdomen.    MUSKL:   GENITALS:     LAB RESULTS:  Recent Results (from the past 24 hour(s))   Culture, Anaerobic and Aerobic    Collection Time: 05/29/20  2:39 PM   Result Value Ref Range    Anaerobic Culture Culture in progress     Organism Staph aureus MRSA (A)     WOUND/ABSCESS (A)      Heavy growth  Sensitivity to follow  CONTACT PRECAUTIONS INDICATED  PBP2= POSITIVE      Organism Gram negative melba (A)     WOUND/ABSCESS Moderate growth  ID and sensitivity to follow        Recent Labs     05/28/20  1600   COLORU DARK YELLOW*   PHUR 6.0   CLARITYU Clear   SPECGRAV 1.036   LEUKOCYTESUR Negative   UROBILINOGEN 4.0*   BILIRUBINUR SMALL*   BLOODU Negative   GLUCOSE 101*        Pathology:     RADIOLOGY RESULTS:  Ct Chest W Contrast    Result Date: 5/29/2020  CT CHEST W CONTRAST : 5/29/2020 CLINICAL MALIGNANCY. DENSE CONSOLIDATION WITHIN THE POSTEROMEDIAL RIGHT LOWER LOBE CONTAINING AN APPROXIMATELY 5.5 CM FLUID CONTAINING COLLECTION CONTAINING GAS, SUSPICIOUS FOR A PULMONARY ABSCESS FROM COMPLICATED POST OBSTRUCTIVE PNEUMONIA. SEVERAL SCATTERED PRESUMED METASTATIC PULMONARY NODULES, MEASURING UP TO 1.8 CM. HEPATIC, PROBABLE RIGHT ADRENAL AND RIGHT UPPER QUADRANT SUBCUTANEOUS METASTATIC DISEASE. POSSIBLE SKELETAL METASTATIC DISEASE TO THE LATERAL LEFT FOURTH RIB. Radha Landa ASSESSMENT AND PLAN  1. Right hilar mass, suspct lung cancer. Check CEA. Consult Dr Stefany Walsh for biopsy. Ct brain.     Electronically signed by Nathalie Howard MD on 5/30/2020 at 2:00 PM

## 2020-05-30 NOTE — CONSULTS
Consult Note    Reason for Consult: History of seizures and lupus. Requesting Physician:  Anni Henry MD    HISTORY OF PRESENT ILLNESS:    The patient is a 62 y.o. male who presents on behavioral health unit where he was admitted for failure to thrive and significant weight loss. Patient reports that he is eating and drinking just fine. He is noted to stay in bed and sleep all day. He is very thin and cachetic. Patient is in no distress, nontoxic and vital signs are stable. During routine medical clearance for admission patient was noted to have right lung mass with suspected malignancy. Lung sounds are clear however right side is extremely diminished in all fields. Past Medical History:   Diagnosis Date    Bipolar affective ([address]     HTN (hypertension)     Hx of drug abuse (HCC)     cocaine, heroin, speed, THC    Osteoarthritis     Seizures ([facility] [facility]     Smoker        Past Surgical History:   Procedure Laterality Date    NOSE SURGERY      When was 14       Prior to Admission medications    Medication Sig Start Date End Date Taking?  Authorizing Provider   fluPHENAZine decanoate (PROLIXIN) 25 MG/ML injection Inject 25 mg into the muscle every 14 days   Yes Historical Provider, MD   ziprasidone (GEODON) 20 MG capsule Take 20 mg by mouth 2 times daily (with meals)   Yes Historical Provider, MD   sulfamethoxazole-trimethoprim (BACTRIM DS) 800-160 MG per tablet Take 1 tablet by mouth 2 times daily for 14 days 5/21/20 6/4/20 Yes David Cuellar MD   Multiple Vitamin (MULTI-VITAMINS) TABS  4/21/20  Yes Historical Provider, MD   cephALEXin (KEFLEX) 500 MG capsule Take 1 capsule by mouth 3 times daily 5/4/20  Yes Stanley Benito PA-C   acetaminophen (APAP EXTRA STRENGTH) 500 MG tablet Take 1 tablet by mouth every 6 hours as needed for Pain 5/4/20  Yes Stanley Benito PA-C   divalproex (DEPAKOTE) 500 MG DR tablet  3/18/20   Historical Provider, MD   phenytoin (DILANTIN) 100 MG ER capsule  3/13/20

## 2020-05-30 NOTE — PROGRESS NOTES
Patient did not attend [facility] despite staff encouragement.  Electronically signed by Selwyn Wagoner on 5/30/2020 at 5:02 PM

## 2020-05-30 NOTE — PROGRESS NOTES
Patient arrive to 4WT from psych. Pt is pink slipped and will be 1:1 until reevaluated. Pt is A&Ox3. Follows commands. VSS. IV placed in left forearm. PCA at bedside for safety.  Electronically signed by Kita Maurer RN on 5/30/2020 at 6:46 PM

## 2020-05-30 NOTE — CONSULTS
organs review of system is done including general, psychological, ENT, hematological, endocrine, respiratory, cardiovascular, gastrointestinal, musculoskeletal, neurological,  allergy and Immunology is done and is otherwise negative. PHYSICAL EXAM:    Vitals:  /69   Pulse 73   Temp 98 °F (36.7 °C) (Oral)   Resp 14   Ht 5' 5\" (1.651 m)   Wt 92 lb 1.6 oz (41.8 kg)   SpO2 98%   BMI 15.33 kg/m²     General: alert, no distress, cachectic  Head: normocephalic, atraumatic  Eyes:No gross abnormalities. ENT:  MMM no lesions  Neck:  supple and no masses  Chest : Diminished air sounds, no wheezing, decreased air entry on the right. Heart[de-identified] Heart sounds are normal.  Regular rate and rhythm without murmur, gallop or rub. ABD:  symmetric, soft, non-tender  Musculoskeletal : no cyanosis, no clubbing and no edema  Neuro:  No apparent focal neuro deficit  Skin: No rashes or nodules noted. Lymph node:  no cervical nodes, patient did not want to move or allow any further lymph node exam  Urology: No Simon   Psychiatric: depressed        Data Review  Recent Labs     05/28/20  1600   WBC 10.8   HGB 12.0*   HCT 36.3*   *      Recent Labs     05/28/20  1600      K 4.0      CO2 27   BUN 14   CREATININE 0.53*   GLUCOSE 101*       MV Settings: ABGs: No results for input(s): PHART, KVE7NRJ, PO2ART, WXI8QYG, BEART, G8BNARBL, LOS8GGG in the last 72 hours.   O2 Device: None (Room air)  No results found for: [address]    Radiology  I personally reviewed imaging studies and CT chest shows large right lower lobe superior segment mass with central necrosis with hilar lymphadenopathy, right upper lobe anterior segment mass also seen, multiple other nodules        Assessment, plan:   Patient is at risk due to    · Multiple lung masses, likely malignant  · Possible 2 etiologies, the mass in the right lower lobe has necrosis concerning for infection  · Right upper lobe lesion and the other nodules suspicious for malignancy  · Smoking  · Pulmonary cachexia  · Probable COPD  · Hypercalcemia likely malignancy related     Recommendation  · Patient needs diagnostic procedure  · Discussed with the patient need for biopsy. Discussed with the patient bronchoscopy  · He declined for now I spoke with his sister-in-law who will come and talk to him. If he agrees I will plan to do it on Monday  · We will plan to do endobronchial ultrasound with transbronchial needle aspiration for hilar lymph nodes  · Will plan transbronchial biopsy for the right lower lobe mass and the right upper lobe mass  · Start Bevespi  · As needed albuterol  · Encourage oral intake  · Start gentle hydration  · Consider bone scan  · If needed can use noninvasive ventilation  · Pulmonary hygiene  · Check ionized calcium    Spoke with patient's sister-in-law Ms. Anna Rodriguez, and discussed with her the need for biopsy she will visit with him and discussed with him his plans. His brother has cancer he does not hear well and was unable to run the conversation.     Discussed with Dr. Jonathon Preciado        Thank you for consultation    Electronically signed by Joanne Celeste MD, [facility],  on 5/30/2020 at 2:26 PM

## 2020-05-30 NOTE — PROGRESS NOTES
Patient wt was taken, wound care performed by TIM Mcclain. Pt was medication compliant and ate breakfast in his room.

## 2020-05-30 NOTE — PROGRESS NOTES
Patient eats breakfast 100%. Pt does not awaken enough to eat lunch. Patients ensure and brownie are left on pts bedside table and pt is encouraged to eat and drink this.  Electronically signed by Matti Brewster LPN on 0/37/9872 at 6:59 PM

## 2020-05-31 ENCOUNTER — APPOINTMENT (OUTPATIENT)
Dept: CT IMAGING | Age: 60
DRG: 136 | End: 2020-05-31
Attending: INTERNAL MEDICINE
Payer: COMMERCIAL

## 2020-05-31 PROBLEM — L02.91 ABSCESS: Status: ACTIVE | Noted: 2020-05-31

## 2020-05-31 LAB
ANAEROBIC CULTURE: ABNORMAL
ANION GAP SERPL CALCULATED.3IONS-SCNC: 13 MEQ/L (ref 9–15)
BASE EXCESS ARTERIAL: 0 (ref -3–3)
BASOPHILS ABSOLUTE: 0.1 K/UL (ref 0–0.2)
BASOPHILS RELATIVE PERCENT: 0.6 %
BUN BLDV-MCNC: 14 MG/DL (ref 8–23)
CALCIUM IONIZED: 1.32 MMOL/L (ref 1.12–1.32)
CALCIUM SERPL-MCNC: 10.1 MG/DL (ref 8.5–9.9)
CHLORIDE BLD-SCNC: 99 MEQ/L (ref 95–107)
CO2: 22 MEQ/L (ref 20–31)
CREAT SERPL-MCNC: 0.45 MG/DL (ref 0.7–1.2)
EOSINOPHILS ABSOLUTE: 0.1 K/UL (ref 0–0.7)
EOSINOPHILS RELATIVE PERCENT: 0.5 %
GFR AFRICAN AMERICAN: >60
GFR AFRICAN AMERICAN: >60
GFR NON-AFRICAN AMERICAN: >60
GFR NON-AFRICAN AMERICAN: >60
GLUCOSE BLD-MCNC: 111 MG/DL (ref 70–99)
GLUCOSE BLD-MCNC: 89 MG/DL (ref 60–115)
GRAM STAIN RESULT: ABNORMAL
HCO3 ARTERIAL: 23.9 MMOL/L (ref 21–29)
HCT VFR BLD CALC: 32 % (ref 42–52)
HEMOGLOBIN: 10.5 G/DL (ref 14–18)
HEMOGLOBIN: 11.6 GM/DL (ref 13.5–17.5)
LACTATE: 1.01 MMOL/L (ref 0.4–2)
LYMPHOCYTES ABSOLUTE: 2.4 K/UL (ref 1–4.8)
LYMPHOCYTES RELATIVE PERCENT: 17.5 %
MCH RBC QN AUTO: 27.3 PG (ref 27–31.3)
MCHC RBC AUTO-ENTMCNC: 32.9 % (ref 33–37)
MCV RBC AUTO: 83 FL (ref 80–100)
MONOCYTES ABSOLUTE: 1.4 K/UL (ref 0.2–0.8)
MONOCYTES RELATIVE PERCENT: 10 %
NEUTROPHILS ABSOLUTE: 9.7 K/UL (ref 1.4–6.5)
NEUTROPHILS RELATIVE PERCENT: 71.4 %
O2 SAT, ARTERIAL: 96 % (ref 93–100)
ORGANISM: ABNORMAL
PCO2 ARTERIAL: 33 MM HG (ref 35–45)
PDW BLD-RTO: 14.1 % (ref 11.5–14.5)
PERFORMED ON: ABNORMAL
PH ARTERIAL: 7.47 (ref 7.35–7.45)
PLATELET # BLD: 538 K/UL (ref 130–400)
PO2 ARTERIAL: 77 MM HG (ref 75–108)
POC CHLORIDE: 99 MEQ/L (ref 99–110)
POC CREATININE: 0.8 MG/DL (ref 0.8–1.3)
POC FIO2: 21
POC HEMATOCRIT: 34 % (ref 41–53)
POC POTASSIUM: 4.5 MEQ/L (ref 3.5–5.1)
POC SAMPLE TYPE: ABNORMAL
POC SODIUM: 132 MEQ/L (ref 136–145)
POTASSIUM SERPL-SCNC: 4.9 MEQ/L (ref 3.4–4.9)
RBC # BLD: 3.86 M/UL (ref 4.7–6.1)
SODIUM BLD-SCNC: 134 MEQ/L (ref 135–144)
TCO2 ARTERIAL: 25 (ref 22–29)
WBC # BLD: 13.7 K/UL (ref 4.8–10.8)
WOUND/ABSCESS: ABNORMAL
WOUND/ABSCESS: ABNORMAL

## 2020-05-31 PROCEDURE — 74177 CT ABD & PELVIS W/CONTRAST: CPT

## 2020-05-31 PROCEDURE — 2580000003 HC RX 258: Performed by: INTERNAL MEDICINE

## 2020-05-31 PROCEDURE — 97162 PT EVAL MOD COMPLEX 30 MIN: CPT

## 2020-05-31 PROCEDURE — 85025 COMPLETE CBC W/AUTO DIFF WBC: CPT

## 2020-05-31 PROCEDURE — 82565 ASSAY OF CREATININE: CPT

## 2020-05-31 PROCEDURE — 97166 OT EVAL MOD COMPLEX 45 MIN: CPT

## 2020-05-31 PROCEDURE — 94761 N-INVAS EAR/PLS OXIMETRY MLT: CPT

## 2020-05-31 PROCEDURE — 94664 DEMO&/EVAL PT USE INHALER: CPT

## 2020-05-31 PROCEDURE — 36415 COLL VENOUS BLD VENIPUNCTURE: CPT

## 2020-05-31 PROCEDURE — 99254 IP/OBS CNSLTJ NEW/EST MOD 60: CPT | Performed by: INTERNAL MEDICINE

## 2020-05-31 PROCEDURE — 94640 AIRWAY INHALATION TREATMENT: CPT

## 2020-05-31 PROCEDURE — 6370000000 HC RX 637 (ALT 250 FOR IP): Performed by: INTERNAL MEDICINE

## 2020-05-31 PROCEDURE — 6360000004 HC RX CONTRAST MEDICATION: Performed by: INTERNAL MEDICINE

## 2020-05-31 PROCEDURE — 84295 ASSAY OF SERUM SODIUM: CPT

## 2020-05-31 PROCEDURE — 85014 HEMATOCRIT: CPT

## 2020-05-31 PROCEDURE — 84132 ASSAY OF SERUM POTASSIUM: CPT

## 2020-05-31 PROCEDURE — 82330 ASSAY OF CALCIUM: CPT

## 2020-05-31 PROCEDURE — 82803 BLOOD GASES ANY COMBINATION: CPT

## 2020-05-31 PROCEDURE — 82435 ASSAY OF BLOOD CHLORIDE: CPT

## 2020-05-31 PROCEDURE — 83605 ASSAY OF LACTIC ACID: CPT

## 2020-05-31 PROCEDURE — 36600 WITHDRAWAL OF ARTERIAL BLOOD: CPT

## 2020-05-31 PROCEDURE — 70470 CT HEAD/BRAIN W/O & W/DYE: CPT

## 2020-05-31 PROCEDURE — 80048 BASIC METABOLIC PNL TOTAL CA: CPT

## 2020-05-31 PROCEDURE — 1210000000 HC MED SURG R&B

## 2020-05-31 PROCEDURE — 6360000002 HC RX W HCPCS: Performed by: INTERNAL MEDICINE

## 2020-05-31 PROCEDURE — 94667 MNPJ CHEST WALL 1ST: CPT

## 2020-05-31 PROCEDURE — 99233 SBSQ HOSP IP/OBS HIGH 50: CPT | Performed by: INTERNAL MEDICINE

## 2020-05-31 RX ORDER — SODIUM CHLORIDE, SODIUM LACTATE, POTASSIUM CHLORIDE, CALCIUM CHLORIDE 600; 310; 30; 20 MG/100ML; MG/100ML; MG/100ML; MG/100ML
INJECTION, SOLUTION INTRAVENOUS CONTINUOUS
Status: DISCONTINUED | OUTPATIENT
Start: 2020-05-31 | End: 2020-05-31

## 2020-05-31 RX ORDER — ALBUTEROL SULFATE 2.5 MG/3ML
2.5 SOLUTION RESPIRATORY (INHALATION) EVERY 6 HOURS PRN
Status: DISCONTINUED | OUTPATIENT
Start: 2020-05-31 | End: 2020-06-04 | Stop reason: HOSPADM

## 2020-05-31 RX ORDER — SODIUM CHLORIDE 9 MG/ML
INJECTION, SOLUTION INTRAVENOUS CONTINUOUS
Status: DISCONTINUED | OUTPATIENT
Start: 2020-05-31 | End: 2020-06-01

## 2020-05-31 RX ORDER — DEXAMETHASONE SODIUM PHOSPHATE 4 MG/ML
4 INJECTION, SOLUTION INTRA-ARTICULAR; INTRALESIONAL; INTRAMUSCULAR; INTRAVENOUS; SOFT TISSUE EVERY 6 HOURS
Status: DISCONTINUED | OUTPATIENT
Start: 2020-05-31 | End: 2020-06-02

## 2020-05-31 RX ADMIN — PIPERACILLIN AND TAZOBACTAM 3.38 G: 3; .375 INJECTION, POWDER, LYOPHILIZED, FOR SOLUTION INTRAVENOUS at 14:14

## 2020-05-31 RX ADMIN — MIRTAZAPINE 15 MG: 15 TABLET, FILM COATED ORAL at 20:10

## 2020-05-31 RX ADMIN — GLYCOPYRROLATE AND FORMOTEROL FUMARATE 2 PUFF: 9; 4.8 AEROSOL, METERED RESPIRATORY (INHALATION) at 11:01

## 2020-05-31 RX ADMIN — ENOXAPARIN SODIUM 40 MG: 40 INJECTION SUBCUTANEOUS at 17:21

## 2020-05-31 RX ADMIN — DEXAMETHASONE SODIUM PHOSPHATE 4 MG: 4 INJECTION, SOLUTION INTRAMUSCULAR; INTRAVENOUS at 23:51

## 2020-05-31 RX ADMIN — SODIUM CHLORIDE: 9 INJECTION, SOLUTION INTRAVENOUS at 16:32

## 2020-05-31 RX ADMIN — PIPERACILLIN AND TAZOBACTAM 3.38 G: 3; .375 INJECTION, POWDER, LYOPHILIZED, FOR SOLUTION INTRAVENOUS at 22:37

## 2020-05-31 RX ADMIN — VANCOMYCIN HYDROCHLORIDE 750 MG: 750 INJECTION, POWDER, LYOPHILIZED, FOR SOLUTION INTRAVENOUS at 20:10

## 2020-05-31 RX ADMIN — IOPAMIDOL 100 ML: 612 INJECTION, SOLUTION INTRAVENOUS at 10:25

## 2020-05-31 RX ADMIN — DEXAMETHASONE SODIUM PHOSPHATE 4 MG: 4 INJECTION, SOLUTION INTRAMUSCULAR; INTRAVENOUS at 17:27

## 2020-05-31 RX ADMIN — DIVALPROEX SODIUM 500 MG: 500 TABLET, DELAYED RELEASE ORAL at 09:30

## 2020-05-31 RX ADMIN — SODIUM CHLORIDE, POTASSIUM CHLORIDE, SODIUM LACTATE AND CALCIUM CHLORIDE: 600; 310; 30; 20 INJECTION, SOLUTION INTRAVENOUS at 11:16

## 2020-05-31 RX ADMIN — DEXAMETHASONE SODIUM PHOSPHATE 4 MG: 4 INJECTION, SOLUTION INTRAMUSCULAR; INTRAVENOUS at 11:40

## 2020-05-31 RX ADMIN — LEVETIRACETAM 500 MG: 100 INJECTION, SOLUTION INTRAVENOUS at 23:51

## 2020-05-31 RX ADMIN — LEVETIRACETAM 500 MG: 100 INJECTION, SOLUTION INTRAVENOUS at 12:34

## 2020-05-31 RX ADMIN — PHENYTOIN SODIUM 100 MG: 100 CAPSULE ORAL at 20:10

## 2020-05-31 RX ADMIN — GLYCOPYRROLATE AND FORMOTEROL FUMARATE 2 PUFF: 9; 4.8 AEROSOL, METERED RESPIRATORY (INHALATION) at 19:32

## 2020-05-31 RX ADMIN — THERA TABS 1 TABLET: TAB at 09:30

## 2020-05-31 ASSESSMENT — PAIN SCALES - GENERAL
PAINLEVEL_OUTOF10: 0
PAINLEVEL_OUTOF10: 0

## 2020-05-31 ASSESSMENT — PAIN SCALES - WONG BAKER: WONGBAKER_NUMERICALRESPONSE: 0

## 2020-05-31 NOTE — CONSULTS
Active member of club or organization: Not on file     Attends meetings of clubs or organizations: Not on file     Relationship status: Not on file    Intimate partner violence     Fear of current or ex partner: Not on file     Emotionally abused: Not on file     Physically abused: Not on file     Forced sexual activity: Not on file   Other Topics Concern    Not on file   Social History Narrative    Not on file            Current Facility-Administered Medications   Medication Dose Route Frequency Provider Last Rate Last Dose    glycopyrrolate-formoterol (BEVESPI) 9-4.8 MCG/ACT inhaler 2 puff  2 puff Inhalation BID Izella Ravel Sedar, DO   2 puff at 05/31/20 1101    albuterol (PROVENTIL) nebulizer solution 2.5 mg  2.5 mg Nebulization Q6H PRN Izella Ravel Sedar, DO        lactated ringers infusion   Intravenous Continuous Tanner D Sedar,  mL/hr at 05/31/20 1116      dexamethasone (DECADRON) injection 4 mg  4 mg Intravenous Q6H Tanner JESSEE Sedar, DO   4 mg at 05/31/20 1140    levETIRAcetam (KEPPRA) 500 mg in sodium chloride 0.9 % 100 mL IVPB  500 mg Intravenous Q12H Izella Ravel Sedar, DO   Stopped at 05/31/20 1249    vancomycin (VANCOCIN) intermittent dosing (placeholder)   Other RX Placeholder Héctor HOOKS Sedar, DO        vancomycin (VANCOCIN) 750 mg in dextrose 5 % 250 mL IVPB  750 mg Intravenous Q12H Tanner D Sedar, DO        acetaminophen (TYLENOL) tablet 650 mg  650 mg Oral Q4H PRN Izella Ravel Sedar, DO        benztropine mesylate (COGENTIN) injection 2 mg  2 mg Intramuscular BID PRN Izella Ravel Sedar, DO        traZODone (DESYREL) tablet 50 mg  50 mg Oral Nightly PRN Izella Ravel Sedar, DO        divalproex (DEPAKOTE) DR tablet 500 mg  500 mg Oral Daily Héctor HOOKS Sedar, DO   500 mg at 05/31/20 0930    haloperidol lactate (HALDOL) injection 5 mg  5 mg Intramuscular Q6H PRN Izella Ravel Sedar, DO        Or    haloperidol (HALDOL) tablet 5 mg  5 mg Oral Q6H PRN Izella Ravel Sedar, DO        hydrOXYzine (VISTARIL) injection 50 mg  50 mg Intramuscular Q6H PM

## 2020-05-31 NOTE — CONSULTS
radiation dose to as low as reasonably achievable. FINDINGS: There are bilateral intraparenchymal ring-enhancing multi septate cerebral lesions with concomitant vasogenic edema, consistent with metastases. Left parieto-occipital region lesion measures 2.7 cm. Left frontal paramedian lesion measures 4.6 cm. Right frontal paramedian lesion measures 2.5 cm. Right cerebellar lesion measures 1 cm. There is 5 mm left to right midline shift. There is no evidence of transtentorial herniation. There are no metastatic skull lesions. The mastoids and middle ears are clear. The orbits and visualized portions of the paranasal sinuses show no significant pathology. CEREBRAL AND CEREBELLAR METASTASES WITH CONCOMITANT VASOGENIC EDEMA. LEFT TO RIGHT MIDLINE SHIFT OF 5 MM. Ct Chest W Contrast    Result Date: 5/29/2020  CT CHEST W CONTRAST : 5/29/2020 CLINICAL HISTORY: suspected lung mass . COMPARISON: Two-view chest and soft tissue ultrasound 4/22/2020. TECHNIQUE: Spiral enhanced imaging was performed of the chest after the uneventful intravenous administration of approximately 75 mL of Isovue-370 contrast. All CT scans at this facility use dose modulation, iterative reconstruction, and/or weight based dosing when appropriate to reduce radiation dose to as low as reasonably achievable. FINDINGS: An approximately 4 x 3.5 cm right hilar mass is present extending anteriorly with confluent moderate right hilar and lower mediastinal lymphadenopathy. Consolidation within the left lower lobe is present, with an approximately 5.5 cm fluid collection containing a small amount of gas, suspicious for a lung abscess from complicated postobstructive pneumonia. A smaller area of consolidation is present within the medial aspects of the junctions of the right upper and lower lobes.  Several scattered up to 2 cm nodules are present within the superior segment of the right lower lobe, with smaller nodules within the right middle, left lower and

## 2020-05-31 NOTE — PROGRESS NOTES
present    Goals:  Patient goals : did not state  Long term goals  Long term goal 1: functional transfers with indep   Long term goal 2: amb 150ft with indep   Long term goal 3: tolerate >10min of functional activities demonstrating activity pacing strategies  Long term goal 4: DGI >21/24 to demonstrate decreased risk for falls  Long term goal 5: indep with HEP to improve LE strength and balance    AMPAC (6 CLICK) BASIC MOBILITY  AM-PAC Inpatient Mobility Raw Score : 20     Therapy Time:   Individual   Time In 0915   Time Out 0927   Minutes [address], [place], 05/31/20 at 9:38 AM         Definitions for assistance levels  Independent = pt does not require any physical supervision or assistance from another person for activity completion. Device may be needed.   Stand by assistance = pt requires verbal cues or instructions from another person, close to but not touching, to perform the activity  Minimal assistance= pt performs 75% or more of the activity; assistance is required to complete the activity  Moderate assistance= pt performs 50% of the activity; assistance is required to complete the activity  Maximal assistance = pt performs 25% of the activity; assistance is required to complete the activity  Dependent = pt requires total physical assistance to accomplish the task

## 2020-05-31 NOTE — PROGRESS NOTES
05/28/20   CT CHEST W CONTRAST    Narrative CT CHEST W CONTRAST : 5/29/2020    CLINICAL HISTORY: suspected lung mass . COMPARISON: Two-view chest and soft tissue ultrasound 4/22/2020. TECHNIQUE: Spiral enhanced imaging was performed of the chest after the uneventful intravenous administration of approximately 75 mL of Isovue-370 contrast.     All CT scans at this facility use dose modulation, iterative reconstruction, and/or weight based dosing when appropriate to reduce radiation dose to as low as reasonably achievable. FINDINGS:     An approximately 4 x 3.5 cm right hilar mass is present extending anteriorly with confluent moderate right hilar and lower mediastinal lymphadenopathy. Consolidation within the left lower lobe is present, with an approximately 5.5 cm fluid collection containing a small amount of gas, suspicious for a lung abscess from complicated postobstructive pneumonia. A smaller area of consolidation is present within the medial aspects of the junctions of the right upper and lower lobes. Several scattered up to 2 cm nodules are present within the superior segment of the right lower lobe, with smaller nodules within the right middle, left lower and right upper lobes, consistent with metastatic disease. Several suspicious hypodensities are present within the liver for metastatic disease, measuring up to approximately 4.3 cm within the medial inferior segment of the left lobe. A heterogeneous approximately 2.3 x 1.6 cm right adrenal mass is probably metastatic disease. The approximately 3.8 x 2 cm subcutaneous soft tissue mass within the right upper quadrant noted on the previous ultrasound is most likely and musculoskeletal metastatic lesion. Mild sclerosis within the lateral aspect of the left fourth rib is nonspecific, but suspicious for metastatic disease. No obvious skeletal metastatic disease is identified elsewhere.      Small right pleural effusion and

## 2020-05-31 NOTE — PROGRESS NOTES
1:1 at bedside for safety/pink slip. VSS. Assessment complete. Dr. Melvin Urbina and Dr. Gabrielle Hung rounded at bedside to explain CT results and cancer prognosis to patient. IV antibiotics reviewed and will be continued per Dr. Jake Estrada. Report given to [facility] RN who will assume care for the remainder of this shift.  Electronically signed by Darcy Lopez RN on 5/31/2020 at 2:19 PM

## 2020-05-31 NOTE — PROGRESS NOTES
Assumed care of patient around [id number] [phone]. 1:1 maintained for safety. Pt is pink slipped. Perfect Serv to Dr Jonathon Preciado to inform of Na 136 yesterday and 132 today.   Electronically signed by Sagrario Henriquez RN on 5/31/2020 at 4:10 PM

## 2020-05-31 NOTE — PROGRESS NOTES
to report who helps him     OBJECTIVE: OT eval completed in pts room with PT     Orientation Status:  Orientation  Overall Orientation Status: Within Functional Limits    Observation:  Observation/Palpation  Posture: Fair  Observation: flat affect, thin, difficulty attending and following commands     Cognition Status:  Cognition  Overall Cognitive Status: Exceptions  Arousal/Alertness: Delayed responses to stimuli, Inconsistent responses to stimuli  Following Commands: Follows multistep commands with increased time, Follows multistep commands with repitition  Attention Span: Difficulty attending to directions, Unable to maintain attention  Memory: Decreased recall of recent events, Decreased short term memory, Decreased long term memory  Problem Solving: Assistance required to identify errors made, Assistance required to implement solutions, Assistance required to generate solutions, Assistance required to correct errors made  Insights: Decreased awareness of deficits  Initiation: Requires cues for some  Sequencing: Requires cues for some    Perception Status:  Perception  Overall Perceptual Status: WFL    Sensation Status:  Sensation  Overall Sensation Status: WNL    Vision and Hearing Status:  Vision  Vision: Impaired  Vision Exceptions: Wears glasses at all times  Hearing  Hearing: Within functional limits     ROM:   LUE AROM (degrees)  LUE AROM : Exceptions  LUE General AROM: limited shoulder ROM 90 degrees  RUE AROM (degrees)  RUE AROM : Exceptions  RUE General AROM: limited shoulder ROM 90 degrees    Strength:  LUE Strength  LUE Strength Comment: 3+/5  RUE Strength  RUE Strength Comment: 3+/5    Coordination, Tone, Quality of Movement:    Tone RUE  RUE Tone: Normotonic  Tone LUE  LUE Tone: Normotonic  Coordination  Movements Are Fluid And Coordinated: Yes  Coordination and Movement description: Fine motor impairments, Gross motor impairments, Decreased speed, Decreased accuracy    Hand Dominance:  Hand Dominance  Hand Dominance: Right    ADL Status:  ADL  Feeding: Independent  Grooming: Independent  UE Bathing: Setup  LE Bathing: Minimal assistance  UE Dressing: Setup  LE Dressing: Moderate assistance(unable to reach down to feet to don/doff socks)  Toileting: Stand by assistance  Toilet Transfers  Toilet - Technique: Ambulating  Toilet Transfer: Stand by assistance       Therapy key for assistance levels -   Independent = Pt. is able to perform task with no assistance but may require a device   Stand by assistance = Pt. does not perform task at an independent level but does not need physical assistance, requires verbal cues  Minimal, Moderate, Maximal Assistance = Pt. requires physical assistance (25%, 50%, 75% assist from helper) for task but is able to actively participate in task   Dependent = Pt. requires total assistance with task and is not able to actively participate with task completion     Functional Mobility:  Functional Mobility  Functional - Mobility Device: No device  Activity: To/from bathroom  Assist Level: Stand by assistance  Transfers  Sit to stand: Modified independent  Stand to sit: Modified independent    Bed Mobility  Bed mobility  Rolling to Right: Modified independent  Supine to Sit: Modified independent  Sit to Supine: Modified independent  Scooting: Modified independent    Seated and Standing Balance:  Balance  Sitting Balance: Modified independent   Standing Balance: Supervision    Functional Endurance:  Activity Tolerance  Activity Tolerance: Patient limited by fatigue    D/C Recommendations:  OT D/C RECOMMENDATIONS  REQUIRES OT FOLLOW UP: Yes    OT Education:   OT Education  OT Education: OT Role, Plan of Care, Transfer Training    OT Follow Up:  OT D/C RECOMMENDATIONS  REQUIRES OT FOLLOW UP: Yes       Assessment/Discharge Disposition:  Assessment: Pt is a 62year old male, recently admitted to [facility] d/t absess and MRSA. Pt has contact precautions.  Pt has multiple comorbities, poor

## 2020-05-31 NOTE — CONSULTS
Ewa Castro  1960  male  Medical Record Number: [id number]    Patient informed that I am an Infectious Disease physician and permission obtained from the patient to speak in front of any visitors prior to any discussion for HIPPA purposes. HPI:  Consult for polymicrobial infection    Patient with soft tissue lesion of the abd x 3 months, painful and enlarging. Found to have lung mass with multiple mets.   + hx of smoking. Patient is thin and cachectic. + psych hx of bipolar  Answers only yes and no but does not elaborate much. Subjectively, no new complaints at this time.      Review of Systems: All 14 review of systems were discussed with the patient and are negative other than as stated above      Past Medical History:   Diagnosis Date    B[facility] affective ([facility]     HTN (hypertension)     Hx of drug abuse ([facility] [address]     cocaine, heroin, speed, THC    Osteoarthritis     Seizures ([facility] [address] [facility]     Smoker        Past Surgical History:   Procedure Laterality Date    NOSE SURGERY      When was 10       Social History     Socioeconomic History    Marital status: Single     Spouse name: Not on file    Number of children: Not on file    Years of education: Not on file    Highest education level: Not on file   Occupational History    Not on file   Social Needs    Financial resource strain: Not on file    Food insecurity     Worry: Not on file     Inability: Not on file    Transportation needs     Medical: Not on file     Non-medical: Not on file   Tobacco Use    Smoking status: Current Every Day Smoker     Types: Cigarettes    Smokeless tobacco: Never Used   Substance and Sexual Activity    Alcohol use: Not on file    Drug use: Not on file    Sexual activity: Not on file   Lifestyle    Physical activity     Days per week: Not on file     Minutes per session: Not on file    Stress: Not on file   Relationships    Social connections     Talks on phone: Not on file     Gets together: Not on file surrounding erythema. +the mass itself is erythematous and tender tan exudate from central opening. Extrem:thin and muscle atrophy  Neuro exam: CN II-XII intact    Labs: I have reviewed all lab results by electronic record, including most recent CBC, metabolic panel, and pertinent abnormalities were addressed from an infectious disease perspective. WBC trends are being monitored. Lab Results   Component Value Date     05/31/2020    K 4.9 05/31/2020    K 4.0 05/28/2020    CL 99 05/31/2020    CO2 22 05/31/2020    BUN 14 05/31/2020    CREATININE 0.8 05/31/2020    CREATININE 0.45 05/31/2020    GLUCOSE 111 05/31/2020    CALCIUM 10.1 05/31/2020      Lab Results   Component Value Date    WBC 13.7 (H) 05/31/2020    HGB 11.6 (L) 05/31/2020    HCT 32.0 (L) 05/31/2020    MCV 83.0 05/31/2020     (H) 05/31/2020       Radiology:   I have reviewed imaging results per electronic record and most pertinent abnormalities are being addressed from an infectious disease standpoint. Assessment:  Advanced disease with lung mass and multiple areas of mets, abdominal soft tissue mass.    Hx of tobacco use and cachexia  Acute metabolic encephalopathy    Plan:  Currently on Zosyn and Clindamycin  Family decisions are pending      Destiny Brown D.O.

## 2020-05-31 NOTE — PROGRESS NOTES
Patient assessment and vitals complete. Patient refused to go to ct scan. When asked patient states 'ill go later, I just don't want to do it now' Patient refused all medication this evening and states 'I will take my medications later maybe' Much education provided to patient, pt not interested and still refusing medications. 1:1 remains in place for patient safety. Will continue to monitor.

## 2020-05-31 NOTE — PLAN OF CARE
Problem: Falls - Risk of:  Goal: Will remain free from falls  5/31/2020 1115 by Jered Leroy RN  Outcome: Ongoing  5/31/2020 0145 by Ayla Thapa RN  Outcome: Ongoing  Goal: Absence of physical injury  Outcome: Ongoing     Problem: Pain:  Description: Pain management should include both nonpharmacologic and pharmacologic interventions.   Goal: Pain level will decrease  Outcome: Ongoing  Goal: Control of acute pain  Outcome: Ongoing  Goal: Control of chronic pain  Outcome: Ongoing     Problem: Safety:  Goal: Free from accidental physical injury  Outcome: Ongoing  Goal: Free from intentional harm  Outcome: Ongoing     Problem: Discharge Planning:  Goal: Patients continuum of care needs are met  Outcome: Ongoing

## 2020-06-01 PROBLEM — E43 SEVERE MALNUTRITION (HCC): Status: ACTIVE | Noted: 2020-06-01

## 2020-06-01 LAB
ANION GAP SERPL CALCULATED.3IONS-SCNC: 14 MEQ/L (ref 9–15)
BASOPHILS ABSOLUTE: 0.1 K/UL (ref 0–0.2)
BASOPHILS RELATIVE PERCENT: 0.5 %
BUN BLDV-MCNC: 13 MG/DL (ref 8–23)
CALCIUM SERPL-MCNC: 9.2 MG/DL (ref 8.5–9.9)
CHLORIDE BLD-SCNC: 102 MEQ/L (ref 95–107)
CO2: 21 MEQ/L (ref 20–31)
CREAT SERPL-MCNC: 0.41 MG/DL (ref 0.7–1.2)
EOSINOPHILS ABSOLUTE: 0 K/UL (ref 0–0.7)
EOSINOPHILS RELATIVE PERCENT: 0.1 %
GFR AFRICAN AMERICAN: >60
GFR NON-AFRICAN AMERICAN: >60
GLUCOSE BLD-MCNC: 144 MG/DL (ref 70–99)
HCT VFR BLD CALC: 29 % (ref 42–52)
HEMOGLOBIN: 9.5 G/DL (ref 14–18)
LYMPHOCYTES ABSOLUTE: 2.1 K/UL (ref 1–4.8)
LYMPHOCYTES RELATIVE PERCENT: 15.9 %
MCH RBC QN AUTO: 27.1 PG (ref 27–31.3)
MCHC RBC AUTO-ENTMCNC: 32.9 % (ref 33–37)
MCV RBC AUTO: 82.6 FL (ref 80–100)
MONOCYTES ABSOLUTE: 1.3 K/UL (ref 0.2–0.8)
MONOCYTES RELATIVE PERCENT: 9.8 %
NEUTROPHILS ABSOLUTE: 9.8 K/UL (ref 1.4–6.5)
NEUTROPHILS RELATIVE PERCENT: 73.7 %
PDW BLD-RTO: 14.1 % (ref 11.5–14.5)
PLATELET # BLD: 513 K/UL (ref 130–400)
POTASSIUM SERPL-SCNC: 4.3 MEQ/L (ref 3.4–4.9)
RBC # BLD: 3.51 M/UL (ref 4.7–6.1)
SODIUM BLD-SCNC: 137 MEQ/L (ref 135–144)
WBC # BLD: 13.3 K/UL (ref 4.8–10.8)

## 2020-06-01 PROCEDURE — 99232 SBSQ HOSP IP/OBS MODERATE 35: CPT | Performed by: PSYCHIATRY & NEUROLOGY

## 2020-06-01 PROCEDURE — 6370000000 HC RX 637 (ALT 250 FOR IP): Performed by: INTERNAL MEDICINE

## 2020-06-01 PROCEDURE — 1210000000 HC MED SURG R&B

## 2020-06-01 PROCEDURE — 2580000003 HC RX 258: Performed by: INTERNAL MEDICINE

## 2020-06-01 PROCEDURE — 99254 IP/OBS CNSLTJ NEW/EST MOD 60: CPT | Performed by: PSYCHIATRY & NEUROLOGY

## 2020-06-01 PROCEDURE — 99232 SBSQ HOSP IP/OBS MODERATE 35: CPT | Performed by: SURGERY

## 2020-06-01 PROCEDURE — APPSS45 APP SPLIT SHARED TIME 31-45 MINUTES: Performed by: NURSE PRACTITIONER

## 2020-06-01 PROCEDURE — 85025 COMPLETE CBC W/AUTO DIFF WBC: CPT

## 2020-06-01 PROCEDURE — 99211 OFF/OP EST MAY X REQ PHY/QHP: CPT

## 2020-06-01 PROCEDURE — 99232 SBSQ HOSP IP/OBS MODERATE 35: CPT | Performed by: INTERNAL MEDICINE

## 2020-06-01 PROCEDURE — 80048 BASIC METABOLIC PNL TOTAL CA: CPT

## 2020-06-01 PROCEDURE — 6360000002 HC RX W HCPCS: Performed by: INTERNAL MEDICINE

## 2020-06-01 PROCEDURE — 36415 COLL VENOUS BLD VENIPUNCTURE: CPT

## 2020-06-01 RX ORDER — LEVETIRACETAM 500 MG/1
500 TABLET ORAL 2 TIMES DAILY
Status: DISCONTINUED | OUTPATIENT
Start: 2020-06-01 | End: 2020-06-02

## 2020-06-01 RX ORDER — SODIUM CHLORIDE 9 MG/ML
INJECTION, SOLUTION INTRAVENOUS CONTINUOUS
Status: ACTIVE | OUTPATIENT
Start: 2020-06-01 | End: 2020-06-01

## 2020-06-01 RX ADMIN — PIPERACILLIN AND TAZOBACTAM 3.38 G: 3; .375 INJECTION, POWDER, LYOPHILIZED, FOR SOLUTION INTRAVENOUS at 05:46

## 2020-06-01 RX ADMIN — SODIUM CHLORIDE: 9 INJECTION, SOLUTION INTRAVENOUS at 07:30

## 2020-06-01 RX ADMIN — DEXAMETHASONE SODIUM PHOSPHATE 4 MG: 4 INJECTION, SOLUTION INTRAMUSCULAR; INTRAVENOUS at 23:35

## 2020-06-01 RX ADMIN — LEVETIRACETAM 500 MG: 100 INJECTION, SOLUTION INTRAVENOUS at 12:45

## 2020-06-01 RX ADMIN — PIPERACILLIN AND TAZOBACTAM 3.38 G: 3; .375 INJECTION, POWDER, LYOPHILIZED, FOR SOLUTION INTRAVENOUS at 14:24

## 2020-06-01 RX ADMIN — DEXAMETHASONE SODIUM PHOSPHATE 4 MG: 4 INJECTION, SOLUTION INTRAMUSCULAR; INTRAVENOUS at 17:11

## 2020-06-01 RX ADMIN — THERA TABS 1 TABLET: TAB at 08:27

## 2020-06-01 RX ADMIN — PHENYTOIN SODIUM 200 MG: 100 CAPSULE ORAL at 05:45

## 2020-06-01 RX ADMIN — DEXAMETHASONE SODIUM PHOSPHATE 4 MG: 4 INJECTION, SOLUTION INTRAMUSCULAR; INTRAVENOUS at 12:44

## 2020-06-01 RX ADMIN — ENOXAPARIN SODIUM 40 MG: 40 INJECTION SUBCUTANEOUS at 08:28

## 2020-06-01 RX ADMIN — DEXAMETHASONE SODIUM PHOSPHATE 4 MG: 4 INJECTION, SOLUTION INTRAMUSCULAR; INTRAVENOUS at 05:46

## 2020-06-01 RX ADMIN — MIRTAZAPINE 15 MG: 15 TABLET, FILM COATED ORAL at 20:22

## 2020-06-01 RX ADMIN — PHENYTOIN SODIUM 100 MG: 100 CAPSULE ORAL at 20:22

## 2020-06-01 RX ADMIN — VANCOMYCIN HYDROCHLORIDE 750 MG: 750 INJECTION, POWDER, LYOPHILIZED, FOR SOLUTION INTRAVENOUS at 10:09

## 2020-06-01 RX ADMIN — LEVETIRACETAM 500 MG: 500 TABLET ORAL at 20:22

## 2020-06-01 RX ADMIN — DIVALPROEX SODIUM 500 MG: 500 TABLET, DELAYED RELEASE ORAL at 08:27

## 2020-06-01 ASSESSMENT — ENCOUNTER SYMPTOMS
GASTROINTESTINAL NEGATIVE: 1
COUGH: 0
WHEEZING: 0
VOMITING: 0
SHORTNESS OF BREATH: 0
TROUBLE SWALLOWING: 0
COUGH: 1
NAUSEA: 0

## 2020-06-01 ASSESSMENT — PAIN SCALES - GENERAL: PAINLEVEL_OUTOF10: 0

## 2020-06-01 NOTE — PROGRESS NOTES
Nutrition Assessment    Type and Reason for Visit: Initial, Positive Nutrition Screen    Nutrition Recommendations: Continue Geneal diet with high calorie ONS TID    Nutrition Assessment: Severe malnutrition related to chronic disease. Hx of poor po intake ~ 3 months pta. Initially admitted to behavioral health unit for FTT, increased depression. Noted lung mass with systemic metastisis. Has had variable po intake during admission    Malnutrition Assessment:  · Malnutrition Status: Meets the criteria for severe malnutrition  · Context: Social or environmental circumstances  · Findings of the 6 clinical characteristics of malnutrition (Minimum of 2 out of 6 clinical characteristics is required to make the diagnosis of moderate or severe Protein Calorie Malnutrition based on AND/ASPEN Guidelines):  1. Energy Intake-Less than or equal to 50% of estimated energy requirement, Greater than or equal to 3 months    2. Weight Loss-2% loss or greater, (< 2 weeks)  3. Fat Loss-Severe subcutaneous fat loss, Triceps, Orbital  4. Muscle Loss-Severe muscle mass loss, Temples (temporalis muscle), Scapula (trapezius)  5. Fluid Accumulation-Unable to assess,    6.   Strength-Not measured    Nutrition Risk Level: High    Nutrient Needs:  · Estimated Daily Total Kcal: 1370-7886 (kg x 30-35)  · Estimated Daily Protein (g): 99-79 gm (1.5-1.8 g/kg)  · Estimated Daily Total Fluid (ml/day): ~1500 ml (1 ml/kcal)    Nutrition Diagnosis:   · Problem: Severe malnutrition, In context of social or environmental circumstances  · Etiology: related to Insufficient energy/nutrient consumption, Psychological cause/life stress     Signs and symptoms:  as evidenced by Weight loss, Severe loss of subcutaneous fat, Severe muscle loss    Objective Information:  · Nutrition-Focused Physical Findings: Hx: Bipolar depression, seizure, polysubstance abuse  · Wound Type: Surgical Wound(I & D abdominal wall cyst (5/15))  · Current Nutrition Therapies:  · Oral Diet Orders: General   · Oral Diet intake: 26-50%(overall since admission.   Variable po intakes per intake documentation)  · Oral Nutrition Supplement (ONS) Orders: Standard High Calorie Oral Supplement(TID)  · ONS intake: 51-75%  · Anthropometric Measures:  · Ht: 5' 5\" (165.1 cm)   · Current Body Wt: 97 lb (44 kg)(6/1-bedscale)  · Admission Body Wt: 95 lb (43.1 kg)(bedscale)  · Usual Body Wt: 119 lb (54 kg)(6/6/19-office visit, 101 lb (5/15/20-office visit))  · % Weight Change:  ,  4 lb (4%) wt loss in < 3 weeks(22 lb (18%) wt loss in ~ 1 yr)  · Ideal Body Wt: 136 lb (61.7 kg), % Ideal Body 71%  · BMI Classification:  BMI <18.5 Underweight    Nutrition Interventions:   Continue current diet, Continue current ONS(Continue Geneal diet with high calorie ONS TID.)  Continued Inpatient Monitoring, Education Not Indicated    Nutrition Evaluation:   · Evaluation: Goals set   · Goals: po intake > 75% of meals and supplements, wt gain > 100 lb    · Monitoring: Meal Intake, Supplement Intake, Weight, Pertinent Labs      Electronically signed by Radhames Cleary RD, LD on 6/1/20 at 2:08 PM EDT

## 2020-06-01 NOTE — CARE COORDINATION
Rounds done this am with nursing and noted Dr. Ken Singer note to state pt does not have capacity to make decisions at present . Pt's sister Milad Al is coming in at 4pm and nurse did let Dr. Joanne Lawrence know so he can meet with her. Dr. Joanne Lawrence did order hospice and we will discuss with sister first and then contact appropriate Hospice.

## 2020-06-01 NOTE — PROGRESS NOTES
phenytoin  200 mg Oral QAM AC    phenytoin  100 mg Oral Nightly    piperacillin-tazobactam  3.375 g Intravenous Q8H     Continuous Infusions:   sodium chloride 75 mL/hr at 20 0730     PRN Meds:.albuterol, acetaminophen, benztropine mesylate, traZODone, haloperidol lactate **OR** haloperidol, hydrOXYzine **OR** hydrOXYzine    OBJECTIVE  CURRENT VITALS:  height is 5' 5\" (1.651 m) and weight is 97 lb (44 kg). His oral temperature is 98.2 °F (36.8 °C). His blood pressure is 115/70 and his pulse is 75. His respiration is 20 and oxygen saturation is 98%. Temperature Range (24h):Temp: 98.2 °F (36.8 °C) Temp  Av.6 °F (37 °C)  Min: 97.5 °F (36.4 °C)  Max: 100.3 °F (37.9 °C)  BP Range (21M): Systolic (34RWW), SHANTELL:456 , Min:101 , O     Diastolic (60DNC), GJQ:89, Min:70, Max:71    Pulse Range (24h): Pulse  Av.7  Min: 75  Max: 99  Respiration Range (24h): Resp  Av.3  Min: 20  Max: 22    GENERAL: alert, no distress  LUNGS: clear to ausculation, without wheezes, rales or rhonci  HEART: normal rate and regular rhythm  ABDOMEN: soft, non-tender, non-distended, bowel sounds present in all 4 quadrants and no guarding or peritoneal signs, 4 cm hard erythematous mass small amount of drainage with a 2 cm opening. Minimal tenderness  EXTERMITY: no cyanosis, clubbing or edema  In: 3047 [P.O.:950; I.V.:2097]  Out: 400 [Urine:400]  Date 20 - 20   Shift  4567-8072 7876-0912 24 Hour Total   INTAKE   P.O.(mL/kg/hr)  240  240   I. V.(mL/kg) 1358(30.9)   1358(30.9)   Shift Total(mL/kg) 1358(30.9) 240(5.5)  5425(01.7)   OUTPUT   Urine(mL/kg/hr)  300  300   Shift Total(mL/kg)  300(6.8)  300(6.8)   Weight (kg) 44 44 44 44       LABS  Recent Labs     20  1854 20  0637 20  1100 20  0636   WBC 11.7* 13.7*  --  13.3*   HGB 10.6* 10.5* 11.6* 9.5*   HCT 32.6* 32.0*  --  29.0*   * 538*  --  513*    134*  --  137   K 4.3 4.9  --  4.3   CL 98 99  --  102   CO2 modulation, iterative reconstruction, and/or weight based dosing when appropriate to reduce radiation dose to as low as reasonably achievable. FINDINGS: An approximately 4 x 3.5 cm right hilar mass is present extending anteriorly with confluent moderate right hilar and lower mediastinal lymphadenopathy. Consolidation within the left lower lobe is present, with an approximately 5.5 cm fluid collection containing a small amount of gas, suspicious for a lung abscess from complicated postobstructive pneumonia. A smaller area of consolidation is present within the medial aspects of the junctions of the right upper and lower lobes. Several scattered up to 2 cm nodules are present within the superior segment of the right lower lobe, with smaller nodules within the right middle, left lower and right upper lobes, consistent with metastatic disease. Several suspicious hypodensities are present within the liver for metastatic disease, measuring up to approximately 4.3 cm within the medial inferior segment of the left lobe. A heterogeneous approximately 2.3 x 1.6 cm right adrenal mass is probably metastatic disease. The approximately 3.8 x 2 cm subcutaneous soft tissue mass within the right upper quadrant noted on the previous ultrasound is most likely and musculoskeletal metastatic lesion. Mild sclerosis within the lateral aspect of the left fourth rib is nonspecific, but suspicious for metastatic disease. No obvious skeletal metastatic disease is identified elsewhere. Small right pleural effusion and pericardials are present. There is no significant left pleural effusion. APPROXIMATELY 4 CM RIGHT HILAR MASS SUSPICIOUS FOR PRIMARY MALIGNANCY. DENSE CONSOLIDATION WITHIN THE POSTEROMEDIAL RIGHT LOWER LOBE CONTAINING AN APPROXIMATELY 5.5 CM FLUID CONTAINING COLLECTION CONTAINING GAS, SUSPICIOUS FOR A PULMONARY ABSCESS FROM COMPLICATED POST OBSTRUCTIVE PNEUMONIA.  SEVERAL SCATTERED PRESUMED METASTATIC PULMONARY NODULES,

## 2020-06-01 NOTE — CONSULTS
[address] [facility] <0.2   BILITOT <0.2   ALKPHOS 132*     No results for input(s): INR in the last 72 hours. No results for input(s): Michele Dust in the last 72 hours. Urinalysis:   Lab Results   Component Value Date    NITRU Negative 05/28/2020    BLOODU Negative 05/28/2020    SPECGRAV 1.036 05/28/2020    GLUCOSEU Negative 05/28/2020       Radiology:   Most recent    EEG No procedure found. MRI of Brain No results found for this or any previous visit. No results found for this or any previous visit. MRA of the Head and Neck: No results found for this or any previous visit. No results found for this or any previous visit. No results found for this or any previous visit. CT of the Head: No results found for this or any previous visit. No results found for this or any previous visit. No results found for this or any previous visit. Carotid duplex: No results found for this or any previous visit. No results found for this or any previous visit. No results found for this or any previous visit. Echo No results found for this or any previous visit. Assessment/Plan:  Acute metabolic encephalopathy likely secondary to diffuse brain metastasis  Lung mass with multiple areas of metastasis including liver, adrenals, muscle, and brain. CEA 50. Primary source unknown  Cerebral edema, Decadron started  Keppra 500 mg twice daily for seizure prophylaxis  Patient also with known history of seizure disorder on Dilantin and Depakote prior to admission with subtherapeutic levels noted. Oncology, infectious disease and pulmonology following  Possible plan for liver biopsy per IR  Patient is opting for treatment at this time  Patient's decision-making capacity is questionable given his underlying psychiatric disorder as well as brain metastasis. Sister is involved in patient's care and will be coming in to discuss treatment options and develop plan of care.   Will

## 2020-06-01 NOTE — PROGRESS NOTES
INPATIENT PROGRESS NOTES    PATIENT NAME: Tor Dinh  MRN: [id number]  SERVICE DATE:  June 1, 2020   SERVICE TIME:  3:26 PM      PRIMARY SERVICE: Pulmonary Disease    CHIEF COMPLAIN: Lung mass      INTERVAL HPI: Patient seen and examined at bedside, Interval Notes, orders reviewed. Nursing notes noted  Patient is very lethargic. Comfortable in bed. He is on room air and O2 saturation is 98%. Discussed with RN. Sister is coming at 4 PM regarding comfort care. Hospice consult placed     OBJECTIVE    Body mass index is 16.14 kg/m². PHYSICAL EXAM:  Vitals:  /70   Pulse 75   Temp 98.2 °F (36.8 °C) (Oral)   Resp 20   Ht 5' 5\" (1.651 m)   Wt 97 lb (44 kg)   SpO2 98%   BMI 16.14 kg/m²   General: Sleepy. Comfortable in bed, No distress. appears stated age and cooperative  Head: Atraumatic , Normocephalic   Eyes: PERRL. No sclera icterus. No conjunctival injection. No discharge   ENT: No nasal  discharge. Pharynx clear. lips, teeth, mucosa and gums are normal, tongue protrudes in the midline  Neck:  Trachea midline. No thyromegaly, no JVD, No cervical adenopathy. Chest : Bilaterally symmetrical ,Normal effort,  No accessory muscle use  Lung : . Fair BS bilateral, decreased BS at bases. No Rales. No wheezing. No rhonchi. No dullness on percussion. Heart[de-identified] Normal  rate. Regular rhythm. No mumur ,  Rub or gallop  ABD: Non-tender. Non-distended. No masses. No organmegaly. Normal bowel sounds. No hernia.   Ext : No Pitting both leg , No Cyanosis No clubbing  Neuro: no focal weakness          DATA:   Recent Labs     05/31/20  0637 05/31/20  1100 06/01/20  0636   WBC 13.7*  --  13.3*   HGB 10.5* 11.6* 9.5*   HCT 32.0*  --  29.0*   MCV 83.0  --  82.6   *  --  513*     Recent Labs     05/30/20  1854 05/31/20  0637 05/31/20  1100 06/01/20  0636    134*  --  137   K 4.3 4.9  --  4.3   CL 98 99  --  102   CO2 22 22  --  21   BUN 16 14  --  13   CREATININE 0.57* 0.45* 0.8 0.41*   GLUCOSE 113* 111*

## 2020-06-01 NOTE — CARE COORDINATION
Phone call received from [facility], Darleen Kahn [phone]. She has worked with pt for about 4 months, but has not been able to see him in person much due to [place] 19 pandemic. She said he lives alone and a brother, Ghada Mejias, is his next of kin(no mention of a sister Josselin Reyna as previously listed). She wanted to be sure hospital was aware that he had an outpatient appt scheduled with Dr. Huseyin Soliz for 6/3 and that he is due for his every other week IM injection of Fluphenazine Decanoate 25 mg. RN made aware of this. Psych not regarding competency reported as pending.

## 2020-06-01 NOTE — PROGRESS NOTES
Left two messages with both Tutu Elizondo and Radha Jay to get consent for his biopsy. Still unable to get a hold of them.

## 2020-06-01 NOTE — PROGRESS NOTES
Lab Results   Component Value Date    MG 2.2 05/28/2020     Phosphorus:  No components found for: PO4  Calcium:  No components found for: CA  CBC:    Lab Results   Component Value Date    WBC 13.3 06/01/2020    RBC 3.51 06/01/2020    HGB 9.5 06/01/2020    HCT 29.0 06/01/2020    MCV 82.6 06/01/2020    RDW 14.1 06/01/2020     06/01/2020     DIFF:    Lab Results   Component Value Date    MCV 82.6 06/01/2020    RDW 14.1 06/01/2020      LDH:  No results found for: LDH  Uric Acid:  No components found for: URIC    EKG Reviewed  Appropriate Radiology Reviewed  C scan of chest :     APPROXIMATELY 4 CM RIGHT HILAR MASS SUSPICIOUS FOR PRIMARY MALIGNANCY.       DENSE CONSOLIDATION WITHIN THE POSTEROMEDIAL RIGHT LOWER LOBE CONTAINING AN APPROXIMATELY 5.5 CM FLUID CONTAINING COLLECTION CONTAINING GAS, SUSPICIOUS FOR A PULMONARY ABSCESS FROM COMPLICATED POST OBSTRUCTIVE PNEUMONIA.       SEVERAL SCATTERED PRESUMED METASTATIC PULMONARY NODULES, MEASURING UP TO 1.8 CM.       HEPATIC, PROBABLE RIGHT ADRENAL AND RIGHT UPPER QUADRANT SUBCUTANEOUS METASTATIC DISEASE.       POSSIBLE SKELETAL METASTATIC DISEASE TO THE LATERAL LEFT FOURTH RIB.           CT scan of abdomen:  *  RIGHT LUNG LOWER LOBE CONSOLIDATION WITH CENTRAL CIRCUMSCRIBED HYPODENSE LESION WHICH COULD REPRESENT LUNG ABSCESS OR TUMOR. *  PULMONARY METASTASES   *  HEPATIC METASTASES   *  ADRENAL METASTASES   *  RIGHT UPPER QUADRANT ANTERIOR ABDOMINAL WALL CUTANEOUS METASTASIS   *  LEFT PSOAS INTRAMUSCULAR METASTASIS   *  PANCREATIC BODY 2.8 CM HYPODENSE LESION, PROBABLY METASTATIC, ALTHOUGH PRIMARY PANCREATIC CARCINOMA COULD ALSO HAVE THIS APPEARANCE. *  ADDITIONAL FINDINGS AS ABOVE. CT scan of head : 5/31/2020  CEREBRAL AND CEREBELLAR METASTASES WITH CONCOMITANT VASOGENIC EDEMA. LEFT TO RIGHT MIDLINE SHIFT OF 5 MM.                   Pathology: Reviewed where indicated      ASSESSMENT:  Active Problems:    Abscess    Severe malnutrition ([address]    Brain

## 2020-06-02 PROCEDURE — 92610 EVALUATE SWALLOWING FUNCTION: CPT

## 2020-06-02 PROCEDURE — 99232 SBSQ HOSP IP/OBS MODERATE 35: CPT | Performed by: PSYCHIATRY & NEUROLOGY

## 2020-06-02 PROCEDURE — 6370000000 HC RX 637 (ALT 250 FOR IP): Performed by: INTERNAL MEDICINE

## 2020-06-02 PROCEDURE — 99233 SBSQ HOSP IP/OBS HIGH 50: CPT | Performed by: PSYCHIATRY & NEUROLOGY

## 2020-06-02 PROCEDURE — 99232 SBSQ HOSP IP/OBS MODERATE 35: CPT | Performed by: INTERNAL MEDICINE

## 2020-06-02 PROCEDURE — 1210000000 HC MED SURG R&B

## 2020-06-02 PROCEDURE — APPSS15 APP SPLIT SHARED TIME 0-15 MINUTES: Performed by: NURSE PRACTITIONER

## 2020-06-02 PROCEDURE — 6370000000 HC RX 637 (ALT 250 FOR IP): Performed by: NURSE PRACTITIONER

## 2020-06-02 PROCEDURE — 6360000002 HC RX W HCPCS: Performed by: INTERNAL MEDICINE

## 2020-06-02 RX ORDER — PANTOPRAZOLE SODIUM 40 MG/1
40 TABLET, DELAYED RELEASE ORAL
Status: DISCONTINUED | OUTPATIENT
Start: 2020-06-02 | End: 2020-06-04 | Stop reason: HOSPADM

## 2020-06-02 RX ORDER — DEXAMETHASONE 4 MG/1
2 TABLET ORAL EVERY 12 HOURS SCHEDULED
Status: DISCONTINUED | OUTPATIENT
Start: 2020-06-05 | End: 2020-06-04 | Stop reason: HOSPADM

## 2020-06-02 RX ORDER — DEXAMETHASONE 4 MG/1
4 TABLET ORAL EVERY 12 HOURS SCHEDULED
Status: DISCONTINUED | OUTPATIENT
Start: 2020-06-02 | End: 2020-06-04 | Stop reason: HOSPADM

## 2020-06-02 RX ADMIN — DIVALPROEX SODIUM 500 MG: 500 TABLET, DELAYED RELEASE ORAL at 09:52

## 2020-06-02 RX ADMIN — THERA TABS 1 TABLET: TAB at 09:52

## 2020-06-02 RX ADMIN — MIRTAZAPINE 15 MG: 15 TABLET, FILM COATED ORAL at 20:23

## 2020-06-02 RX ADMIN — DEXAMETHASONE SODIUM PHOSPHATE 4 MG: 4 INJECTION, SOLUTION INTRAMUSCULAR; INTRAVENOUS at 06:06

## 2020-06-02 RX ADMIN — LEVETIRACETAM 750 MG: 500 TABLET ORAL at 20:22

## 2020-06-02 RX ADMIN — DEXAMETHASONE SODIUM PHOSPHATE 4 MG: 4 INJECTION, SOLUTION INTRAMUSCULAR; INTRAVENOUS at 11:44

## 2020-06-02 RX ADMIN — PHENYTOIN SODIUM 200 MG: 100 CAPSULE ORAL at 06:06

## 2020-06-02 RX ADMIN — DEXAMETHASONE 4 MG: 4 TABLET ORAL at 20:23

## 2020-06-02 RX ADMIN — LEVETIRACETAM 750 MG: 500 TABLET ORAL at 09:52

## 2020-06-02 ASSESSMENT — ENCOUNTER SYMPTOMS
WHEEZING: 0
VOMITING: 0
COLOR CHANGE: 0
TROUBLE SWALLOWING: 1
CHEST TIGHTNESS: 0
COUGH: 1
COUGH: 0
NAUSEA: 0
SHORTNESS OF BREATH: 1
GASTROINTESTINAL NEGATIVE: 1

## 2020-06-02 ASSESSMENT — PAIN SCALES - GENERAL
PAINLEVEL_OUTOF10: 0
PAINLEVEL_OUTOF10: 0

## 2020-06-02 ASSESSMENT — PAIN SCALES - WONG BAKER: WONGBAKER_NUMERICALRESPONSE: 0

## 2020-06-02 NOTE — PROGRESS NOTES
increased mastication time secondary to oral weakness and being edentulous. Increased oral transit time with lingual pumping was observed with all trials. Patient would benefit from a MBS. However, MD does not want MBS secondary to possible hospice. MD wants safest diet recommendations from ST based off of BSe results. Dysphagia Outcome Severity Scale: Level 4: Mild moderate dysphagia- Intermittent supervision/cueing. One - two diet consistencies restricted     Treatment Plan  Requires SLP Intervention: Yes  Duration/Frequency of Treatment: 2-3x/week for LOS or until all goals are met  D/C Recommendations: To be determined       Treatment/Goals  Short-term Goals  Timeframe for Short-term Goals: 1 week  Goal 1: Patient will tolerate SOft, bite size with nectar thick liquids without overt s/s of aspiration with 100% of trials. Long-term Goals  Timeframe for Long-term Goals: 1-2 weeks  Goal 1: Patient will tolerate LRD without overt s/s of aspiration  Dysphagia Goals: The patient will recall and perform compensatory strategies, with no cues. , The patient/caregiver will demonstrate understanding of compensatory strategies for improved swallowing safety. , The patient will tolerate thin liquids without signs and symptoms of aspiration 10/10 via cup.     Prognosis  Prognosis  Prognosis for safe diet advancement: fair  Barriers to reach goals: fatigue;other (comment)  Barriers/Prognosis Comment: Medical prognosis  Individuals consulted  Consulted and agree with results and recommendations: Patient;RN(Gabby)    Education  Patient Education: Patient educated on BSE results  Patient Education Response: Verbalizes understanding;Needs reinforcement  Safety Devices in place: Yes  Type of devices: Bed alarm in place;Call light within reach    Pain:  Pain Assessment  Patient Currently in Pain: Denies  Pain Assessment: 0-10  Friend-Baker Pain Rating: No hurt(no s/s of discomfort/distress noted)  Pain Level: 0       Therapy

## 2020-06-02 NOTE — PROGRESS NOTES
Physical Therapy Missed Treatment   Facility/Department: [facility] MED SURG [id number]    NAME: Cy Babcock    : 1960 (62 y.o.)  MRN: [id number]    Account: [de-identified]  Gender: male    Chart reviewed, attempted PT at 13:52. Patient unavailable 2° to:    [] Hold per nsg request    [x] Pt declined pt states he is not feeling well enough to participate in therapy at this time. Encouragement given, pt still unwilling. [x] Nsg notified   [] Other notified    [] Pt. . off floor for test/procedure. [] Pt. Unavailable       Will attempt PT treatment again at earliest convenience.       Electronically signed by Jah Anderson PTA on 20 at 2:04 PM EDT

## 2020-06-02 NOTE — PROGRESS NOTES
[facility]. FOLLOW-UP NOTE       6/2/2020     Patient was seen and examined in person, Chart reviewed   Patient's case discussed with staff/team    Patient Location:   [facility], [address]      Provider Location ([facility]/State):   [place]         Chief Complaint: Depression    Interim History:     Pt report feeling depressed but not suicidal  Denies AH or VH  Passive and withdrawn  Tired looking  Denies hopeless and worthless feeling  Being referred to hospice but when I asked question reg capacity eval - believe that patient does not have capacity to make decision reg treatment plan  Has been compliant with treatment  No active SI  Appetite:   [] Normal/Unchanged  [] Increased  [x] Decreased      Sleep:       [] Normal/Unchanged  [] Fair       [x] Poor              Energy:    [] Normal/Unchanged  [] Increased  [x] Decreased        SI [] Present  [x] Absent    HI  []Present  [] Absent     Aggression:  [] yes  [] no    Patient is [] able  [] unable to CONTRACT FOR SAFETY     PAST MEDICAL/PSYCHIATRIC HISTORY:   Past Medical History:   Diagnosis Date    Bipolar affective [facility]     HTN (hypertension)     [facility]     cocaine, heroin, speed, THC    Osteoarthritis     Seizures ([facility]     Smoker        FAMILY/SOCIAL HISTORY:  No family history on file.   Social History     Socioeconomic History    Marital status: Single     Spouse name: Not on file    Number of children: Not on file    Years of education: Not on file    Highest education level: Not on file   Occupational History    Not on file   Social Needs    Financial resource strain: Not on file    Food insecurity     Worry: Not on file     Inability: Not on file    Transportation needs     Medical: Not on file     Non-medical: Not on file   Tobacco Use    Smoking status: Current Every Day Smoker     Types: Cigarettes    Smokeless tobacco: Never Used   Substance and Sexual Activity    Alcohol use: Not on file    Drug use: Not on file    Sexual activity: Not on file   Lifestyle    Physical activity     Days per week: Not on file     Minutes per session: Not on file    Stress: Not on file   Relationships    Social connections     Talks on phone: Not on file     Gets together: Not on file     Attends [employer]ism service: Not on file     Active member of club or organization: Not on file     Attends meetings of clubs or organizations: Not on file     Relationship status: Not on file    Intimate partner violence     Fear of current or ex partner: Not on file     Emotionally abused: Not on file     Physically abused: Not on file     Forced sexual activity: Not on file   Other Topics Concern    Not on file   Social History Narrative    Not on file           ROS:  [x] All negative/unchanged except if checked.  Explain positive(checked items) below:  [] Constitutional  [] Eyes  [] Ear/Nose/Mouth/Throat  [] Respiratory  [] CV  [] GI  []   [] Musculoskeletal  [] Skin/Breast  [] Neurological  [] Endocrine  [] Heme/Lymph  [] Allergic/Immunologic    Explanation:     MEDICATIONS:    Current Facility-Administered Medications:     levETIRAcetam (KEPPRA) tablet 750 mg, 750 mg, Oral, BID, SYEDA Guallpa - CNP, 750 mg at 06/02/20 4433    dexamethasone (DECADRON) tablet 4 mg, 4 mg, Oral, 2 times per day **FOLLOWED BY** [START ON 6/5/2020] dexamethasone (DECADRON) tablet 2 mg, 2 mg, Oral, 2 times per day, Jc Sandra DO    pantoprazole (PROTONIX) tablet 40 mg, 40 mg, Oral, Harry CAREY DO    glycopyrrolate-formoterol (BEVESPI) 9-4.8 MCG/ACT inhaler 2 puff, 2 puff, Inhalation, BID, Marcela Indianola Sedar, DO, Stopped at 06/01/20 1953    albuterol (PROVENTIL) nebulizer solution 2.5 mg, 2.5 mg, Nebulization, Q6H PRN, Marcela Indianola Sedar, DO    acetaminophen (TYLENOL) tablet 650 mg, 650 mg, Oral, Q4H PRN, Marcela Indianola Sedar, DO    benztropine mesylate (COGENTIN) injection 2 mg, 2 mg, Intramuscular, BID PRN,

## 2020-06-02 NOTE — PROGRESS NOTES
102   CO2 22 22  --  21   BUN 16 14  --  13   CREATININE 0.57* 0.45* 0.8 0.41*   GLUCOSE 113* 111*  --  144*   CALCIUM 10.1* 10.1*  --  9.2   PROT 6.9  --   --   --    LABALBU 3.2*  --   --   --    BILITOT <0.2  --   --   --    ALKPHOS 132*  --   --   --    AST 14  --   --   --    ALT 14  --   --   --    LABGLOM >60.0 >60.0 >60 >60.0   GFRAA >60.0 >60.0 >60 >60.0       MV Settings:          Recent Labs     05/31/20  1100   PHART 7.468*   [id number] 33*   PO2ART 77*   [id number] 23.9   BEART 0   [id number] 96*       O2 Device: None (Room air)    DIET GENERAL;  Dietary Nutrition Supplements: Standard High Calorie Oral Supplement     MEDICATIONS during current hospitalization:    Continuous Infusions:      Scheduled Meds:   levETIRAcetam  750 mg Oral BID    dexamethasone  4 mg Oral 2 times per day    Followed by   Janice Fraser ON 6/5/2020] dexamethasone  2 mg Oral 2 times per day    pantoprazole  40 mg Oral QAM AC    glycopyrrolate-formoterol  2 puff Inhalation BID    divalproex  500 mg Oral Daily    mirtazapine  15 mg Oral Nightly    multivitamin  1 tablet Oral Daily       PRN Meds:albuterol, acetaminophen, benztropine mesylate, traZODone, haloperidol lactate **OR** haloperidol, hydrOXYzine **OR** hydrOXYzine    Radiology  Ct Head W Wo Contrast    Result Date: 5/31/2020  EXAMINATION: CT HEAD W WO CONTRAST CLINICAL HISTORY:  headache with right hilar mass. Status post incision and drainage of abdominal wall abscess on 5/15/2020 COMPARISON: NONE AVAILABLE TECHNIQUE:  Spiral scans without and with bolus administration of intravenous contrast.  (100 ml Isovue-300). Multiplanar 2-D reconstructions. All CT scans at this facility use dose modulation, iterative reconstruction, and/or weight based dosing when appropriate to reduce radiation dose to as low as reasonably achievable.  FINDINGS: There are bilateral intraparenchymal ring-enhancing multi septate cerebral lesions with concomitant vasogenic edema, consistent with metastases. Left parieto-occipital region lesion measures 2.7 cm. Left frontal paramedian lesion measures 4.6 cm. Right frontal paramedian lesion measures 2.5 cm. Right cerebellar lesion measures 1 cm. There is 5 mm left to right midline shift. There is no evidence of transtentorial herniation. There are no metastatic skull lesions. The mastoids and middle ears are clear. The orbits and visualized portions of the paranasal sinuses show no significant pathology. CEREBRAL AND CEREBELLAR METASTASES WITH CONCOMITANT VASOGENIC EDEMA. LEFT TO RIGHT MIDLINE SHIFT OF 5 MM. Ct Chest W Contrast    Result Date: 5/29/2020  CT CHEST W CONTRAST : 5/29/2020 CLINICAL HISTORY: suspected lung mass . COMPARISON: Two-view chest and soft tissue ultrasound 4/22/2020. TECHNIQUE: Spiral enhanced imaging was performed of the chest after the uneventful intravenous administration of approximately 75 mL of Isovue-370 contrast. All CT scans at this facility use dose modulation, iterative reconstruction, and/or weight based dosing when appropriate to reduce radiation dose to as low as reasonably achievable. FINDINGS: An approximately 4 x 3.5 cm right hilar mass is present extending anteriorly with confluent moderate right hilar and lower mediastinal lymphadenopathy. Consolidation within the left lower lobe is present, with an approximately 5.5 cm fluid collection containing a small amount of gas, suspicious for a lung abscess from complicated postobstructive pneumonia. A smaller area of consolidation is present within the medial aspects of the junctions of the right upper and lower lobes. Several scattered up to 2 cm nodules are present within the superior segment of the right lower lobe, with smaller nodules within the right middle, left lower and right upper lobes, consistent with metastatic disease.  Several suspicious hypodensities are present within the liver for metastatic disease, measuring up to approximately 4.3 cm within the

## 2020-06-02 NOTE — PROGRESS NOTES
05/30/20  1854 05/31/20  0637 05/31/20  1100 06/01/20  0636    134*  --  137   K 4.3 4.9  --  4.3   CL 98 99  --  102   CO2 22 22  --  21   BUN 16 14  --  13   CREATININE 0.57* 0.45* 0.8 0.41*   CALCIUM 10.1* 10.1*  --  9.2     Recent Labs     05/30/20  1854   AST 14   ALT 14   BILIDIR <0.2   BILITOT <0.2   ALKPHOS 132*     No results for input(s): INR in the last 72 hours. No results for input(s): Teodoro Alejandre in the last 72 hours. Urinalysis:   Lab Results   Component Value Date    NITRU Negative 05/28/2020    BLOODU Negative 05/28/2020    SPECGRAV 1.036 05/28/2020    GLUCOSEU Negative 05/28/2020       Radiology:   Most recent    EEG No procedure found. MRI of Brain No results found for this or any previous visit. No results found for this or any previous visit. MRA of the Head and Neck: No results found for this or any previous visit. No results found for this or any previous visit. No results found for this or any previous visit. CT of the Head: No results found for this or any previous visit. No results found for this or any previous visit. Results for orders placed during the hospital encounter of 05/28/20   CT HEAD W WO CONTRAST    Narrative EXAMINATION: CT HEAD W WO CONTRAST    CLINICAL HISTORY:  headache with right hilar mass. Status post incision and drainage of abdominal wall abscess on 5/15/2020    COMPARISON: NONE AVAILABLE    TECHNIQUE:  Spiral scans without and with bolus administration of intravenous contrast.   (100 ml Isovue-300). Multiplanar 2-D reconstructions. All CT scans at this facility use dose modulation, iterative reconstruction, and/or weight based dosing when appropriate to reduce radiation dose to as low as reasonably achievable. FINDINGS: There are bilateral intraparenchymal ring-enhancing multi septate cerebral lesions with concomitant vasogenic edema, consistent with metastases.  Left parieto-occipital region lesion

## 2020-06-02 NOTE — PROGRESS NOTES
20 [id number]       Physical Examination:      Vitals:  /68   Pulse 74   Temp 98.2 °F (36.8 °C) (Oral)   Resp 26   Ht 5' 5\" (1.651 m)   Wt 97 lb (44 kg)   SpO2 99%   BMI 16.14 kg/m²   Temp (24hrs), Av.3 °F (36.8 °C), Min:98.2 °F (36.8 °C), Max:98.4 °F (36.9 °C)      General appearance: Resting comfortably. Cooperative. Answers questions appropriately. Oriented to person but states he \"does not know\" when asked location and time. Frail. Malnourished  Mental Status: oriented to person, place and time and normal affect  Lungs: clear to auscultation bilaterally, normal effort  Heart: regular rate and rhythm, no murmur  Abdomen: soft, nontender, nondistended, bowel sounds present, no masses  Extremities: no edema, redness, tenderness in the calves  Skin: no gross lesions, rashes    Data:     Labs:  Recent Labs     20  0637 20  1100 20  0636   WBC 13.7*  --  13.3*   HGB 10.5* 11.6* 9.5*   *  --  513*     Recent Labs     20  0637 20  1100 20  0636   *  --  137   K 4.9  --  4.3   CL 99  --  102   CO2 22  --  21   BUN 14  --  13   CREATININE 0.45* 0.8 0.41*   GLUCOSE 111*  --  144*     Recent Labs     20  1854   AST 14   ALT 14   BILITOT <0.2   ALKPHOS 132*       Assessment and Plan:        1. Metastatic cancer with unknown primary: Proceeding with comfort care-hospice meeting 6/3. Discontinue nonessential medications and blood draws. 2.  Metabolic and other encephalopathy due to brain metastases with vasogenic edema: Adjust to p.o. Decadron    3. Dysphagia: RN reporting coughing after eating. Will have SLP assess mainly to find consistency that patient best tolerates. Diet: DIET GENERAL;  Dietary Nutrition Supplements: Standard High Calorie Oral Supplement  lovenox  DNR-CC    Dispo: inpatient.  Awaiting hospice meeting    Electronically signed by Leland Borges DO on 2020 at 12:19 PM

## 2020-06-03 LAB
CALCIUM IONIZED, CALC AT PH 7.4: 1.4 MMOL/L (ref 1.11–1.3)
CALCIUM IONIZED: 1.32 MMOL/L (ref 1.11–1.3)

## 2020-06-03 PROCEDURE — 6360000002 HC RX W HCPCS: Performed by: INTERNAL MEDICINE

## 2020-06-03 PROCEDURE — 6370000000 HC RX 637 (ALT 250 FOR IP): Performed by: INTERNAL MEDICINE

## 2020-06-03 PROCEDURE — 99232 SBSQ HOSP IP/OBS MODERATE 35: CPT | Performed by: PSYCHIATRY & NEUROLOGY

## 2020-06-03 PROCEDURE — 97116 GAIT TRAINING THERAPY: CPT

## 2020-06-03 PROCEDURE — 1210000000 HC MED SURG R&B

## 2020-06-03 PROCEDURE — 92526 ORAL FUNCTION THERAPY: CPT

## 2020-06-03 PROCEDURE — 99232 SBSQ HOSP IP/OBS MODERATE 35: CPT | Performed by: INTERNAL MEDICINE

## 2020-06-03 PROCEDURE — 6370000000 HC RX 637 (ALT 250 FOR IP): Performed by: NURSE PRACTITIONER

## 2020-06-03 RX ORDER — PANTOPRAZOLE SODIUM 40 MG/1
40 TABLET, DELAYED RELEASE ORAL
Qty: 30 TABLET | Refills: 3 | DISCHARGE
Start: 2020-06-04

## 2020-06-03 RX ORDER — LEVETIRACETAM 750 MG/1
750 TABLET ORAL 2 TIMES DAILY
Qty: 60 TABLET | Refills: 3 | DISCHARGE
Start: 2020-06-03

## 2020-06-03 RX ORDER — DEXAMETHASONE 2 MG/1
2 TABLET ORAL EVERY 12 HOURS SCHEDULED
Qty: 10 TABLET | Refills: 0 | DISCHARGE
Start: 2020-06-05 | End: 2020-06-10

## 2020-06-03 RX ADMIN — LEVETIRACETAM 750 MG: 500 TABLET ORAL at 08:30

## 2020-06-03 RX ADMIN — MIRTAZAPINE 15 MG: 15 TABLET, FILM COATED ORAL at 20:47

## 2020-06-03 RX ADMIN — ACETAMINOPHEN 650 MG: 325 TABLET ORAL at 10:58

## 2020-06-03 RX ADMIN — THERA TABS 1 TABLET: TAB at 08:31

## 2020-06-03 RX ADMIN — DEXAMETHASONE 4 MG: 4 TABLET ORAL at 20:47

## 2020-06-03 RX ADMIN — DIVALPROEX SODIUM 500 MG: 500 TABLET, DELAYED RELEASE ORAL at 08:31

## 2020-06-03 RX ADMIN — LEVETIRACETAM 750 MG: 500 TABLET ORAL at 20:47

## 2020-06-03 RX ADMIN — PANTOPRAZOLE SODIUM 40 MG: 40 TABLET, DELAYED RELEASE ORAL at 06:14

## 2020-06-03 RX ADMIN — DEXAMETHASONE 4 MG: 4 TABLET ORAL at 08:30

## 2020-06-03 ASSESSMENT — PAIN SCALES - GENERAL
PAINLEVEL_OUTOF10: 0
PAINLEVEL_OUTOF10: 0
PAINLEVEL_OUTOF10: 6

## 2020-06-03 NOTE — PROGRESS NOTES
Physical Therapy Med Surg Daily Treatment Note  Facility/Department: [facility]  Room: [place] [address]       NAME: Toshia Setting  : 1960 (1 y.o.)  MRN: [id number]  CODE STATUS: DNR-CC    Date of Service: 6/3/2020    Patient Diagnosis(es): Abscess [L02.91]   No chief complaint on file. Patient Active Problem List    Diagnosis Date Noted    Severe malnutrition ([facility] 2020    Brain metastasis (HCC)     Cerebral edema (HCC)     Acute encephalopathy     Abscess 2020    Inflamed epidermoid cyst of skin 2020    Schizoaffective disorder ([facility] 2020    Infected sebaceous cyst 2020        Past Medical History:   Diagnosis Date    Bipolar affective ([facility]     HTN (hypertension)     Hx of drug abuse (HCC)     cocaine, heroin, speed, THC    Osteoarthritis     Seizures ([facility]     Smoker      Past Surgical History:   Procedure Laterality Date    NOSE SURGERY      When was 14       Restrictions  Restrictions/Precautions: Fall Risk    SUBJECTIVE   General  Chart Reviewed: Yes  Family / Caregiver Present: No  Subjective  Subjective: agreeable to ambulate into restroom. Pre-Session Pain Report  Pre Treatment Pain Screening  Pain at present: 0  Scale Used: Numeric Score  Intervention List: Patient able to continue with treatment  Pain Screening  Patient Currently in Pain: No       Post-Session Pain Report  Pain Assessment  Pain Assessment: 0-10  Pain Level: 0         OBJECTIVE        Bed mobility  Rolling to Right: Modified independent  Supine to Sit: Modified independent    Transfers  Sit to Stand: Independent  Stand to sit: Independent    Ambulation  Ambulation?: Yes  Ambulation 1  Surface: level tile  Device: No Device  Assistance: Stand by assistance  Gait Deviations: Slow Linda  Distance: 20' within room with turns and tight spaces. Comments: pt unwilling to ambulate outside of room.                Neuromuscular Education  Neuromuscular Comments: standing balance

## 2020-06-03 NOTE — PROGRESS NOTES
[facility]. FOLLOW-UP NOTE       6/3/2020     Patient was seen and examined in person, Chart reviewed   Patient's case discussed with staff/team    Patient Location:   [facility], [address]      Provider Location (City/State):   [place]         Chief Complaint: Depression    Interim History:     Fluctuating level of conscious  Still feel depressed but not suicidal  Lethargic  Denies hopeless feeling  Pt does not have capacity to make decision reg treatment plan  No active SI  Appetite:   [] Normal/Unchanged  [] Increased  [x] Decreased      Sleep:       [] Normal/Unchanged  [] Fair       [x] Poor              Energy:    [] Normal/Unchanged  [] Increased  [x] Decreased        SI [] Present  [x] Absent    HI  []Present  [] Absent     Aggression:  [] yes  [] no    Patient is [] able  [] unable to CONTRACT FOR SAFETY     PAST MEDICAL/PSYCHIATRIC HISTORY:   Past Medical History:   Diagnosis Date    Bipolar affective ([facility]     HTN (hypertension)     Hx of drug abuse ([facility]     cocaine, heroin, speed, THC    Osteoarthritis     Seizures ([facility]     Smoker        FAMILY/SOCIAL HISTORY:  No family history on file.   Social History     Socioeconomic History    Marital status: Single     Spouse name: Not on file    Number of children: Not on file    Years of education: Not on file    Highest education level: Not on file   Occupational History    Not on file   Social Needs    Financial resource strain: Not on file    Food insecurity     Worry: Not on file     Inability: Not on file    Transportation needs     Medical: Not on file     Non-medical: Not on file   Tobacco Use    Smoking status: Current Every Day Smoker     Types: Cigarettes    Smokeless tobacco: Never Used   Substance and Sexual Activity    Alcohol use: Not on file    Drug use: Not on file    Sexual activity: Not on file   Lifestyle    Physical activity     Days per week: Not on file     Minutes Sedar, DO    divalproex (DEPAKOTE) DR tablet 500 mg, 500 mg, Oral, Daily, Tanner HOOKS Sedar, DO, 500 mg at 06/03/20 0831    haloperidol lactate (HALDOL) injection 5 mg, 5 mg, Intramuscular, Q6H PRN **OR** haloperidol (HALDOL) tablet 5 mg, 5 mg, Oral, Q6H PRN, Regenia Neas Sedar, DO    hydrOXYzine (VISTARIL) injection 50 mg, 50 mg, Intramuscular, Q6H PRN **OR** hydrOXYzine (VISTARIL) capsule 50 mg, 50 mg, Oral, Q6H PRN, Regenia Neas Sedar, DO    mirtazapine (REMERON) tablet 15 mg, 15 mg, Oral, Nightly, Tanner HOOKS Sedar, DO, 15 mg at 06/02/20 2023    multivitamin 1 tablet, 1 tablet, Oral, Daily, Regenia Neas Sedar, DO, 1 tablet at 06/03/20 2086      Examination:  /71   Pulse 77   Temp 97.7 °F (36.5 °C) (Oral)   Resp 18   Ht 5' 5\" (1.651 m)   Wt 97 lb (44 kg)   SpO2 96%   BMI 16.14 kg/m²   Gait - in bed  Medication side effects(SE): no    Mental Status Examination:    Level of consciousness:  within normal limits   Appearance:  poor grooming and poor hygiene  Behavior/Motor:  psychomotor retardation  Attitude toward examiner:  withdrawn  Speech:  slow   Mood: depressed  Affect:  blunted  Thought processes:  slow   Thought content:  Suicidal Ideation:  denies suicidal ideation  Cognition:  oriented to person, place, and time   Concentration poor  Insight poor   Judgement poor     ASSESSMENT:   Patient symptoms are:  [] Well controlled  [] Improving  [] Worsening  [x] No change      Diagnosis:  Schizoaffective disorder- depressed   Active Problems:    Abscess    Severe malnutrition ([facility]    Brain metastasis (HCC)    Cerebral edema ([facility]    Acute encephalopathy  Resolved Problems:    * No resolved hospital problems. *      LABS:    Recent Labs     06/01/20  0636   WBC 13.3*   HGB 9.5*   *     Recent Labs     06/01/20  0636      K 4.3      CO2 21   BUN 13   CREATININE 0.41*   GLUCOSE 144*     No results for input(s): BILITOT, ALKPHOS, AST, ALT in the last 72 hours.   Lab Results   Component Value Date    Nagi Piper

## 2020-06-03 NOTE — DISCHARGE INSTR - COC
by Ysabel Yoo RN on 6/1/2020 at 7:36 AM       Resolved    COVID-19 Rule Out 05/28/20 05/28/20 05/28/20 COVID-19 (Ordered)   05/28/20 Rule-Out Test Resulted          Nurse Assessment:  Last Vital Signs: /71   Pulse 77   Temp 97.7 °F (36.5 °C) (Oral)   Resp 18   Ht 5' 5\" (1.651 m)   Wt 97 lb (44 kg)   SpO2 96%   BMI 16.14 kg/m²     Last documented pain score (0-10 scale): Pain Level: 6  Last Weight:   Wt Readings from Last 1 Encounters:   06/01/20 97 lb (44 kg)     Mental Status:  alert and confused at times    IV Access:  - None    Nursing Mobility/ADLs:  Walking   Assisted  Transfer  Assisted  Bathing  Assisted  Dressing  Assisted  Toileting  Assisted  Feeding  Independent  Med Admin  Independent  Med Delivery   whole and prefers mixed with apple sauce    Wound Care Documentation and Therapy:  Wound Abdomen Upper;Mid abcess (Active)   Wound Other 5/31/2020  8:15 PM   Dressing Status Clean; Intact;Dry 6/3/2020  8:38 AM   Dressing Changed Changed/New 6/2/2020  9:53 AM   Dressing/Treatment ABD 6/2/2020  9:53 AM   Wound Assessment Black;Edema;Red;Swelling;Tan;Yellow 6/2/2020  9:53 AM   Number of days:         Elimination:  Continence:   · Bowel: Yes  · Bladder: Yes  Urinary Catheter: None   Colostomy/Ileostomy/Ileal Conduit: No       Date of Last BM: 6-3-2020    Intake/Output Summary (Last 24 hours) at 6/3/2020 1351  Last data filed at 6/3/2020 0941  Gross per 24 hour   Intake 690 ml   Output 550 ml   Net 140 ml     I/O last 3 completed shifts: In: 372 [P.O.:860; I.V.:10]  Out: 875 [Urine:875]    Safety Concerns:      At Risk for Falls    Impairments/Disabilities:      Dr. Marko Shell deemed incompetent    Nutrition Therapy:  Current Nutrition Therapy:   - Oral Diet:  Dysphagia 2 mechanically altered    Routes of Feeding: Oral  Liquids: Nectar Thick Liquids  Daily Fluid Restriction: no  Last Modified Barium Swallow with Video (Video Swallowing Test): not done    Treatments at the Time of [facility]

## 2020-06-03 NOTE — PROGRESS NOTES
CREATININE 0.8 0.41*   GLUCOSE  --  144*   CALCIUM  --  9.2   LABGLOM >60 >60.0   GFRAA >60 >60.0       MV Settings:          Recent Labs     05/31/20  1100   PHART 7.468*   [id number] 33*   PO2ART 77*   [id number] 23.9   BEART 0   [id number] 96*       O2 Device: None (Room air)    Dietary Nutrition Supplements: Standard High Calorie Oral Supplement  DIET DYSPHAGIA SOFT AND BITE-SIZED; Mildly Thick (Nectar)     MEDICATIONS during current hospitalization:    Continuous Infusions:      Scheduled Meds:   levETIRAcetam  750 mg Oral BID    dexamethasone  4 mg Oral 2 times per day    Followed by   Freddy Real ON 6/5/2020] dexamethasone  2 mg Oral 2 times per day    pantoprazole  40 mg Oral QAM AC    glycopyrrolate-formoterol  2 puff Inhalation BID    divalproex  500 mg Oral Daily    mirtazapine  15 mg Oral Nightly    multivitamin  1 tablet Oral Daily       PRN Meds:albuterol, acetaminophen, benztropine mesylate, traZODone, haloperidol lactate **OR** haloperidol, hydrOXYzine **OR** hydrOXYzine    Radiology  Ct Head W Wo Contrast    Result Date: 5/31/2020  EXAMINATION: CT HEAD W WO CONTRAST CLINICAL HISTORY:  headache with right hilar mass. Status post incision and drainage of abdominal wall abscess on 5/15/2020 COMPARISON: NONE AVAILABLE TECHNIQUE:  Spiral scans without and with bolus administration of intravenous contrast.  (100 ml Isovue-300). Multiplanar 2-D reconstructions. All CT scans at this facility use dose modulation, iterative reconstruction, and/or weight based dosing when appropriate to reduce radiation dose to as low as reasonably achievable. FINDINGS: There are bilateral intraparenchymal ring-enhancing multi septate cerebral lesions with concomitant vasogenic edema, consistent with metastases. Left parieto-occipital region lesion measures 2.7 cm. Left frontal paramedian lesion measures 4.6 cm. Right frontal paramedian lesion measures 2.5 cm. Right cerebellar lesion measures 1 cm.  There is 5 mm left to right midline shift. There is no evidence of transtentorial herniation. There are no metastatic skull lesions. The mastoids and middle ears are clear. The orbits and visualized portions of the paranasal sinuses show no significant pathology. CEREBRAL AND CEREBELLAR METASTASES WITH CONCOMITANT VASOGENIC EDEMA. LEFT TO RIGHT MIDLINE SHIFT OF 5 MM. Ct Chest W Contrast    Result Date: 5/29/2020  CT CHEST W CONTRAST : 5/29/2020 CLINICAL HISTORY: suspected lung mass . COMPARISON: Two-view chest and soft tissue ultrasound 4/22/2020. TECHNIQUE: Spiral enhanced imaging was performed of the chest after the uneventful intravenous administration of approximately 75 mL of Isovue-370 contrast. All CT scans at this facility use dose modulation, iterative reconstruction, and/or weight based dosing when appropriate to reduce radiation dose to as low as reasonably achievable. FINDINGS: An approximately 4 x 3.5 cm right hilar mass is present extending anteriorly with confluent moderate right hilar and lower mediastinal lymphadenopathy. Consolidation within the left lower lobe is present, with an approximately 5.5 cm fluid collection containing a small amount of gas, suspicious for a lung abscess from complicated postobstructive pneumonia. A smaller area of consolidation is present within the medial aspects of the junctions of the right upper and lower lobes. Several scattered up to 2 cm nodules are present within the superior segment of the right lower lobe, with smaller nodules within the right middle, left lower and right upper lobes, consistent with metastatic disease. Several suspicious hypodensities are present within the liver for metastatic disease, measuring up to approximately 4.3 cm within the medial inferior segment of the left lobe. A heterogeneous approximately 2.3 x 1.6 cm right adrenal mass is probably metastatic disease.  The approximately 3.8 x 2 cm subcutaneous soft tissue mass within the right upper quadrant noted on the previous ultrasound is most likely and musculoskeletal metastatic lesion. Mild sclerosis within the lateral aspect of the left fourth rib is nonspecific, but suspicious for metastatic disease. No obvious skeletal metastatic disease is identified elsewhere. Small right pleural effusion and pericardials are present. There is no significant left pleural effusion. APPROXIMATELY 4 CM RIGHT HILAR MASS SUSPICIOUS FOR PRIMARY MALIGNANCY. DENSE CONSOLIDATION WITHIN THE POSTEROMEDIAL RIGHT LOWER LOBE CONTAINING AN APPROXIMATELY 5.5 CM FLUID CONTAINING COLLECTION CONTAINING GAS, SUSPICIOUS FOR A PULMONARY ABSCESS FROM COMPLICATED POST OBSTRUCTIVE PNEUMONIA. SEVERAL SCATTERED PRESUMED METASTATIC PULMONARY NODULES, MEASURING UP TO 1.8 CM. HEPATIC, PROBABLE RIGHT ADRENAL AND RIGHT UPPER QUADRANT SUBCUTANEOUS METASTATIC DISEASE. POSSIBLE SKELETAL METASTATIC DISEASE TO THE LATERAL LEFT FOURTH RIB. Ct Abdomen Pelvis W Iv Contrast Additional Contrast? Radiologist Recommendation    Result Date: 5/31/2020  EXAMINATION: CT ABDOMEN AND PELVIS WITH CONTRAST CLINICAL HISTORY: Abdominal pain. Status post incision and drainage of abdominal wall abscess on 5/15/2020. Packing has been removed. COMPARISON: CT chest 5/29/2020 TECHNIQUE: Spiral scans with bolus administration of  100ml of Isovue-300. Multiplanar 2-D reconstructions. All CT scans at this facility use dose modulation, iterative reconstruction, and/or weight based dosing when appropriate to reduce radiation dose to as low as reasonably achievable. FINDINGS: As indicated on CT chest report performed 2 days ago, there is right lung volume loss with mild mediastinal shift to the right, right lower lung consolidation, and a circumscribed oval hypodensity within the consolidation concerning for abscess. There is a small amount of gas in the anterior aspect of the hypodensity. Necrotic tumor could also have this appearance.  There is a 1.4 cm metastatic nodule in the left retroperitoneum, gastrointestinal tract, mesentery, lymph nodes, inguinal regions, osseous structures, abdominopelvic walls, and visualized portions of the lower thorax. Some structures may be congenitally or surgically absent/altered. Unless otherwise indicated in this report, these organs and structures demonstrate no significant pathology or demonstrate findings which do not require additional follow-up/evaluation. *  RIGHT LUNG LOWER LOBE CONSOLIDATION WITH CENTRAL CIRCUMSCRIBED HYPODENSE LESION WHICH COULD REPRESENT LUNG ABSCESS OR TUMOR. *  PULMONARY METASTASES *  HEPATIC METASTASES *  ADRENAL METASTASES *  RIGHT UPPER QUADRANT ANTERIOR ABDOMINAL WALL CUTANEOUS METASTASIS *  LEFT PSOAS INTRAMUSCULAR METASTASIS *  PANCREATIC BODY 2.8 CM HYPODENSE LESION, PROBABLY METASTATIC, ALTHOUGH PRIMARY PANCREATIC CARCINOMA COULD ALSO HAVE THIS APPEARANCE. *  ADDITIONAL FINDINGS AS ABOVE. No orders to display       IMPRESSION AND SUGGESTION:  1. Lung mass with metastasis  2. Brain metastasis with midline shift  3. Severe malnutrition  4. Hypercalcemia    Discussed with RN. Brother agreed for hospice. continue bronchodilator therapy with Bevespi 2 puff twice a day. Nebulizer with albuterol every 6 hours as needed. He is on Decadron.   He will go to nursing home with hospice    Electronically signed by Javi Shah MD, FCCP on 6/3/2020 at 10:28 AM

## 2020-06-03 NOTE — PROGRESS NOTES
[facility] referral completed with patient being found appropriate for [place] [address] services at a long term care facility or family members home at discharge. Patient is not currently appropriate to inpatient services. Please update [place] [address] [phone] with discharge plan and/or any changes in patient's condition should they occur.

## 2020-06-03 NOTE — PROGRESS NOTES
[place] sister in law called and I let her know that [facility] will take him when insurance goes through.

## 2020-06-04 VITALS
TEMPERATURE: 98.2 F | BODY MASS INDEX: 16.16 KG/M2 | OXYGEN SATURATION: 96 % | HEART RATE: 83 BPM | DIASTOLIC BLOOD PRESSURE: 76 MMHG | RESPIRATION RATE: 18 BRPM | WEIGHT: 97 LBS | SYSTOLIC BLOOD PRESSURE: 111 MMHG | HEIGHT: 65 IN

## 2020-06-04 PROCEDURE — 6370000000 HC RX 637 (ALT 250 FOR IP): Performed by: INTERNAL MEDICINE

## 2020-06-04 PROCEDURE — 92526 ORAL FUNCTION THERAPY: CPT

## 2020-06-04 PROCEDURE — 6360000002 HC RX W HCPCS: Performed by: INTERNAL MEDICINE

## 2020-06-04 PROCEDURE — 6370000000 HC RX 637 (ALT 250 FOR IP): Performed by: NURSE PRACTITIONER

## 2020-06-04 PROCEDURE — 99232 SBSQ HOSP IP/OBS MODERATE 35: CPT | Performed by: INTERNAL MEDICINE

## 2020-06-04 RX ADMIN — DEXAMETHASONE 4 MG: 4 TABLET ORAL at 09:55

## 2020-06-04 RX ADMIN — DIVALPROEX SODIUM 500 MG: 500 TABLET, DELAYED RELEASE ORAL at 09:54

## 2020-06-04 RX ADMIN — PANTOPRAZOLE SODIUM 40 MG: 40 TABLET, DELAYED RELEASE ORAL at 06:19

## 2020-06-04 RX ADMIN — LEVETIRACETAM 750 MG: 500 TABLET ORAL at 09:55

## 2020-06-04 RX ADMIN — THERA TABS 1 TABLET: TAB at 09:55

## 2020-06-04 NOTE — PROGRESS NOTES
Physician Progress Note    6/4/2020   12:02 PM    Name:  Tristen Mendieta  MRN:    [id number]      Day: 5     Admit Date: 5/30/2020  6:23 PM  PCP: Tawny Palm    Code Status:  DNR-CC    Subjective:     Patient resting comfortably.   No complaints    Current Facility-Administered Medications   Medication Dose Route Frequency Provider Last Rate Last Dose    levETIRAcetam (KEPPRA) tablet 750 mg  750 mg Oral BID SYEDA Velez - CNP   750 mg at 06/04/20 0955    dexamethasone (DECADRON) tablet 4 mg  4 mg Oral 2 times per day Maribel Turpin DO   4 mg at 06/04/20 6463    Followed by   López Arce ON 6/5/2020] dexamethasone (DECADRON) tablet 2 mg  2 mg Oral 2 times per day Maribel Turpin DO        pantoprazole (PROTONIX) tablet 40 mg  40 mg Oral QAM AC Maribel Turpin DO   40 mg at 06/04/20 1397    glycopyrrolate-formoterol (BEVESPI) 9-4.8 MCG/ACT inhaler 2 puff  2 puff Inhalation BID Gem Najjar Sedar, DO   Stopped at 06/01/20 1953    albuterol (PROVENTIL) nebulizer solution 2.5 mg  2.5 mg Nebulization Q6H PRN Gem Najjar Sedar, DO        acetaminophen (TYLENOL) tablet 650 mg  650 mg Oral Q4H PRN Gem Najjar Sedar, DO   650 mg at 06/03/20 1058    benztropine mesylate (COGENTIN) injection 2 mg  2 mg Intramuscular BID PRN Gem Najjar Sedar, DO        traZODone (DESYREL) tablet 50 mg  50 mg Oral Nightly PRN Gem Najjar Sedar, DO        divalproex (DEPAKOTE) DR tablet 500 mg  500 mg Oral Daily [facility] D Sedar, DO   500 mg at 06/04/20 7510    haloperidol lactate (HALDOL) injection 5 mg  5 mg Intramuscular Q6H PRN Gem Najjar Sedar, DO        Or    haloperidol (HALDOL) tablet 5 mg  5 mg Oral Q6H PRN Gem Najjar Sedar, DO        hydrOXYzine (VISTARIL) injection 50 mg  50 mg Intramuscular Q6H PRN Gem Najjar Sedar, DO        Or    hydrOXYzine (VISTARIL) capsule 50 mg  50 mg Oral Q6H PRN Gem Najjar Sedar, DO        mirtazapine (REMERON) tablet 15 mg  15 mg Oral Nightly Tanner JESSEE Hear, DO   15 mg at 06/03/20 2047    multivitamin 1 tablet  1 tablet Oral Daily Letha Guidry DO   1 tablet at 20 1346       Physical Examination:      Vitals:  /76   Pulse 83   Temp 98.2 °F (36.8 °C) (Oral)   Resp 18   Ht 5' 5\" (1.651 m)   Wt 97 lb (44 kg)   SpO2 96%   BMI 16.14 kg/m²   Temp (24hrs), Av.3 °F (36.8 °C), Min:98.2 °F (36.8 °C), Max:98.4 °F (36.9 °C)      General appearance: Resting comfortably. Cooperative. Answers questions appropriately. Oriented to person but states he \"does not know\" when asked location and time. Frail. Malnourished  Mental Status: oriented to person, place and time and normal affect  Lungs: clear to auscultation bilaterally, normal effort  Heart: regular rate and rhythm, no murmur  Abdomen: soft, nontender, nondistended, bowel sounds present, no masses  Extremities: no edema, redness, tenderness in the calves  Skin: no gross lesions, rashes    Data:     Labs:  No results for input(s): WBC, HGB, PLT in the last 72 hours. No results for input(s): NA, K, CL, CO2, BUN, CREATININE, GLUCOSE in the last 72 hours. No results for input(s): AST, ALT, ALB, BILITOT, ALKPHOS in the last 72 hours. Assessment and Plan:        1. Metastatic cancer with unknown primary: Precertification obtained this morning so patient may be discharged to SNF where he will be followed by hospice.   Will update discharge summary from     Electronically signed by Belen Griffin DO on 2020 at 12:02 PM

## 2020-06-04 NOTE — PROGRESS NOTES
Physical Therapy Missed Treatment   Facility/Department: [facility] MED SURG [id number]/[id number]    NAME: Jose Pablo    : 1960 (62 y.o.)  MRN: [id number]    Account: [de-identified]  Gender: male    Chart reviewed, attempted PT at 36. Patient unavailable 2° to:      [x] Pt.  Unavailable: discharging with hospice      Electronically signed by Manuel Claire PTA on 20 at 10:29 AM EDT

## 2020-06-04 NOTE — PROGRESS NOTES
[facility]  Facility/Department: [facility] MED SURG UNIT  Speech Language Pathology   Treatment Note    Araceli Fitzgerald  1960  [id number]    Medical Dx: Abscess [L02.91]  Speech Dx: Dysphagia     6/4/2020    Subjective:  Alert and Cooperative        Interventions used this date:  Dysphagia Treatment    Objective/Assessment:  Patient progressing towards goals:  Short-term Goals  Timeframe for Short-term Goals: 1 week  Goal 1: Patient will tolerate Soft, bite size with nectar thick liquids without overt s/s of aspiration with 100% of trials. Pt initially declined all PO but agreed after encouragement. Pt tolerated soft and bite sized solids with adequate mastication and oral clearance of bolus in 2/2 trials with no overt s/s of aspiration. Pt tolerated mildly thick (nectar) liquid via cup with no overt s/s of aspiration in 3/3 trials. Pt requested to stop PO after the above mentioned trials. When asked if he ate breakfast today, pt stated \"some. \"     Pt to d/c this date to SNF with hospice. Continue with current diet. Discussed with RN Milad Aguirre. Treatment/Activity Tolerance:  Patient tolerated treatment well    Plan:  Discharge   Pt to d/c this date with hospice services    Pain:  Patient denies pain. Patient demonstrates no s/s of pain. Patient/Caregiver Education:  Patient educated on session and progression towards goals. Education needs reinforcement. Safety Devices:   All fall risk precautions in place      Therapy Time  Time in: 1027  Time out: [place]  Minutes: 10    Signature: Electronically signed by KOLBY Melissa on 6/4/2020 at 10:38 AM

## 2020-06-04 NOTE — PROGRESS NOTES
INPATIENT PROGRESS NOTES    PATIENT NAME: Sonya Redd  MRN: [id number]  SERVICE DATE:  June 4, 2020   SERVICE TIME:  12:27 PM      PRIMARY SERVICE: Pulmonary Disease    CHIEF COMPLAIN: Lung mass      INTERVAL HPI: Patient seen and examined at bedside, Interval Notes, orders reviewed. Nursing notes noted  Patient going to be discharged today to nursing home with hospice care. Comfortable in bed. No new problem overnight. No complaint of shortness of breath. No chest pain or pleuritic pain. No fever or chills. No nausea vomiting or diarrhea. He is on room air and O2 saturation is 96%. OBJECTIVE    Body mass index is 16.14 kg/m². PHYSICAL EXAM:  Vitals:  /76   Pulse 83   Temp 98.2 °F (36.8 °C) (Oral)   Resp 18   Ht 5' 5\" (1.651 m)   Wt 97 lb (44 kg)   SpO2 96%   BMI 16.14 kg/m²   General: Sleepy. Comfortable in bed, No distress. appears stated age and cooperative  Head: Atraumatic , Normocephalic   Eyes: PERRL. No sclera icterus. No conjunctival injection. No discharge   ENT: No nasal  discharge. Pharynx clear. lips, teeth, mucosa and gums are normal, tongue protrudes in the midline  Neck:  Trachea midline. No thyromegaly, no JVD, No cervical adenopathy. Chest : Bilaterally symmetrical ,Normal effort,  No accessory muscle use  Lung : . Fair BS bilateral, decreased BS at bases. No Rales. No wheezing. No rhonchi. No dullness on percussion. Heart[de-identified] Normal  rate. Regular rhythm. No mumur ,  Rub or gallop  ABD: Non-tender. Non-distended. No masses. No organmegaly. Normal bowel sounds. No hernia. Ext : No Pitting both leg , No Cyanosis No clubbing  Neuro: no focal weakness          DATA:   No results for input(s): WBC, HGB, HCT, MCV, PLT in the last 72 hours. No results for input(s): NA, K, CL, CO2, BUN, CREATININE, GLUCOSE, CALCIUM, PROT, LABALBU, BILITOT, ALKPHOS, AST, ALT, LABGLOM, GFRAA, AGRATIO, GLOB in the last 72 hours.     MV Settings:          No results for input(s): PHART, disease. No obvious skeletal metastatic disease is identified elsewhere. Small right pleural effusion and pericardials are present. There is no significant left pleural effusion. APPROXIMATELY 4 CM RIGHT HILAR MASS SUSPICIOUS FOR PRIMARY MALIGNANCY. DENSE CONSOLIDATION WITHIN THE POSTEROMEDIAL RIGHT LOWER LOBE CONTAINING AN APPROXIMATELY 5.5 CM FLUID CONTAINING COLLECTION CONTAINING GAS, SUSPICIOUS FOR A PULMONARY ABSCESS FROM COMPLICATED POST OBSTRUCTIVE PNEUMONIA. SEVERAL SCATTERED PRESUMED METASTATIC PULMONARY NODULES, MEASURING UP TO 1.8 CM. HEPATIC, PROBABLE RIGHT ADRENAL AND RIGHT UPPER QUADRANT SUBCUTANEOUS METASTATIC DISEASE. POSSIBLE SKELETAL METASTATIC DISEASE TO THE LATERAL LEFT FOURTH RIB. Ct Abdomen Pelvis W Iv Contrast Additional Contrast? Radiologist Recommendation    Result Date: 5/31/2020  EXAMINATION: CT ABDOMEN AND PELVIS WITH CONTRAST CLINICAL HISTORY: Abdominal pain. Status post incision and drainage of abdominal wall abscess on 5/15/2020. Packing has been removed. COMPARISON: CT chest 5/29/2020 TECHNIQUE: Spiral scans with bolus administration of  100ml of Isovue-300. Multiplanar 2-D reconstructions. All CT scans at this facility use dose modulation, iterative reconstruction, and/or weight based dosing when appropriate to reduce radiation dose to as low as reasonably achievable. FINDINGS: As indicated on CT chest report performed 2 days ago, there is right lung volume loss with mild mediastinal shift to the right, right lower lung consolidation, and a circumscribed oval hypodensity within the consolidation concerning for abscess. There is a small amount of gas in the anterior aspect of the hypodensity. Necrotic tumor could also have this appearance. There is a 1.4 cm metastatic nodule in the left lung lower lobe. There are multiple hypodense hepatic lesions consistent with metastases.  There are approximately 10 hepatic lesions, with the largest in the medial segment of the left lobe, with a longest dimension of 4 cm. There are bilateral heterogenous adrenal lesions, suspicious for metastases. As reported on previous examination, there is a right cutaneous/subcutaneous 4 cm mass in the anterior abdominal wall, approximately 7 cm cephalad to the level of the umbilicus, consistent with metastatic lesion. The lesion demonstrates an anterior contour excavation, concordant with previous surgical incision and drainage. There is a 2.5 cm ring enhancing lesion within the left psoas muscle (series 2, #55) consistent with metastatic lesion. There is a 2.8 cm hypodense lesion in the mid body of the pancreas, probably metastatic, although primary pancreatic neoplasm could also have this appearance. There are degenerative changes in the spine, most prominent at L4-5 where there is disc space narrowing, degenerative disc vacuum phenomenon, and anterior vertebral marginal osteophytosis. No definite suspicious osseous lesions are identified. There is a right femoral TFN fixation. There is heterogenous attenuation of a moderately enlarged prostate gland. It should be noted that absence of oral contrast lowers the sensitivity of this examination, particularly in evaluating bowel and mesentery in this patient with paucity of intra-abdominal mesenteric fat. No intra-abdominal abscess is identified. On some scan slices, there are questionable left-sided mesenteric hypodense metastatic masses, but these have a variability of appearance between the immediate and delayed scans, and are therefore regarded as probably representing fluid within bowel loops. Note: This interpretation includes assessment of the liver, spleen, gallbladder, bile ducts, pancreas, adrenal glands, kidneys, urogenital structures, abdominal vasculature, retroperitoneum, gastrointestinal tract, mesentery, lymph nodes, inguinal regions, osseous structures, abdominopelvic walls, and visualized portions of the lower thorax.   Some structures may be

## 2020-06-05 LAB
BLOOD CULTURE, ROUTINE: NORMAL
BLOOD CULTURE, ROUTINE: NORMAL

## 2020-06-05 NOTE — PROGRESS NOTES
Physical Therapy  Facility/Department: [facility] MED SURG O854/[id number]  Physical Therapy Discharge      NAME: Tor Dinh    : 1960 (62 y.o.)  MRN: [id number]    Account: [de-identified]  Gender: male      Patient has been discharged from acute care hospital. DC patient from current PT program.      Electronically signed by Kia Hoyos PT on 20 at 10:33 AM EDT

## 2020-06-05 NOTE — ADT AUTH CERT
Patient Demographics     Name Patient ID SSN Gender Identity Birth Date   Nati Merritt [id number]  Male [birth year] (59 yrs)   Address Phone Email Employer    [employer]y Colon [phone] (Y)  [phone] [place])  [address] Race Occupation Emp Status    - White - Disabled    Reg Status PCP Date Last Verified Next Review Date    Verified Karen Ramírez  [phone] 20    Admission Date Discharge Date Admitting Provider     20      Marital Status Yazidism      Single Non-Episcopalian      Emergency Contact 1 Emergency Contact 2 Emergency Contact 42 [facility] (C)  [phone] (M) Angela Linton (6)  [place]  [phone] (E)  [phone] (I)  [phone] [place]) [place]  (3)  [phone] [place] [place])   [facility] Account [de-identified]   CVG Subscriber Name/Sex/Relation Subscriber  Subscriber Address/Phone Subscriber Emp/Emp Phone   1.  Meggan Hinton  [id number] Bob Grimes - Male  (Self) 1960 [address] [place] [address]  [address]  [phone](N) DISABLED   Utilization Reviews         Medical Oncology GRG - Care Day 4 (2020) by Katerin Ferrara RN         Review Entered Review Status   2020 16:46 Completed       Criteria Review      Care Day: 4 Care Date: 2020 Level of Care: Inpatient Floor    Guideline Day 3    Level Of Care    ( ) * Activity level acceptable    ( ) * Complete discharge planning    Clinical Status    (X) * Pain and nausea absent or adequately managed    2020 4:46 PM EDT by Jerryl Minus      No Pain meds given    (X) * Temperature status acceptable    2020 4:46 PM EDT by Jerryl Minus      98.2 (36.8)    ( ) * No infection, or status acceptable    ( ) * No neutropenia, or status acceptable    2020 4:46 PM EDT by Isaacryl Minus      Last labs     ( ) * Abdominal status acceptable    ( ) * Mucositis absent or adequately resolved    ( ) * Diarrhea absent or adequately dullness on percussion. Heart[de-identified] Normal  rate. Regular rhythm. No mumur ,  Rub or gallop   ABD: Non-tender. Non-distended. No masses. No organmegaly. Normal bowel sounds. No hernia. Radiology testing/Labs pertinent   Creatinine: 0.41 (L)   Glucose: 144 (H)   WBC: 13.3 (H)   RBC: 3.51 (L)   Hemoglobin Quant: 9.5 (L)   Hematocrit: 29.0 (L)   MCHC: 32.9 (L)   Platelet Count: 672 (H)   Neutrophils Absolute: 9.8 (H)   Monocytes Absolute: 1.3 (H)      Meds-name, dose, route,rate   0.9 % sodium chloride infusion    Rate: 75 mL/hr Freq: CONTINUOUS Route: IV      Scheduled Meds:   levETIRAcetam, 750 mg, Oral, BID   dexamethasone, 4 mg, Oral, 2 times per day   glycopyrrolate-formoterol, 2 puff, Inhalation, BID   divalproex, 500 mg, Oral, Daily   mirtazapine, 15 mg, Oral, Nightly          Treatment plan-attending, consults   GENERAL SURGEON   ASSESSMENT   1. Hospital day # 2   2.  Abdominal wall mass, cyst versus tumor       PLAN   1.  Biopsy of abdominal wall mass today under local anesthesia     The options of therapy were discussed and the patient agrees to the above procedure. The procedure  as well as potential risks and complications including but not exclusive to infection, blood loss, damage to surrounding structures and even requiring further surgery  were discussed. All questions are answered . The patient appears to understands and is agreeable to the surgery. 2.  Please see the consult note done on 5/29/2020 on the behavioral health unit. PULMONOLOGIST:   Patient is very lethargic.     IMPRESSION AND SUGGESTION:   1. Lung mass with metastasis   2. Brain metastasis with midline shift   3. Severe malnutrition   4. Hypercalcemia       Discussed with RN. Marta Vizcarra is very lethargic but does wake up on and off.  Sister is coming today at 4 PM and decide about hospice care.  At this time we will continue present treatment plan. HEM-0NC   Patient is reluctant to cooperate.  He understands the seriousness

## 2020-06-07 NOTE — DISCHARGE SUMMARY
having a pH significantly  different from pH 7.4. Sample pH may be artificially decreased due to  delayed  processing and may be increased when the sample is exposed to air. Due  to  these factors it is recommended that the ionized calcium normalized to  pH 7.4  be interpreted with caution and only used when the clinician has  knowledge of  the patient's acid/base status. Accurate determination of in vivo pH is  best  determined by arterial blood gas testing. Access complete set of age- and/or gender-specific reference intervals  for  this test in the [facility] Test Directory (Modular Patterns). Performed at: [facility] [address] [place] [address] [address] [place] [address] ph 7.4 05/31/2020 1.40* 1.11 - 1.30 mmol/L Final    Comment: REFERENCE INTERVAL: Calcium Ionized pH 7.4  Access complete set of age- and/or gender-specific reference intervals  for  this test in the [facility] Test Directory (Modular Patterns). Performed at: [facility] [address] [place] [id number]      Sodium 06/01/2020 137  135 - 144 mEq/L Final    Potassium 06/01/2020 4.3  3.4 - 4.9 mEq/L Final    Chloride 06/01/2020 102  95 - 107 mEq/L Final    CO2 06/01/2020 21  20 - 31 mEq/L Final    Anion Gap 06/01/2020 14  9 - 15 mEq/L Final    Glucose 06/01/2020 144* 70 - 99 mg/dL Final    BUN 06/01/2020 13  8 - 23 mg/dL Final    CREATININE 06/01/2020 0.41* 0.70 - 1.20 mg/dL Final    GFR Non- 06/01/2020 >60.0  >60 Final    Comment: >60 mL/min/1.73m2 EGFR, calc. for ages 25 and older using the  MDRD formula (not corrected for weight), is valid for stable  renal function.  GFR  06/01/2020 >60.0  >60 Final    Comment: >60 mL/min/1.73m2 EGFR, calc. for ages 25 and older using the  MDRD formula (not corrected for weight), is valid for stable  renal function.       Calcium 06/01/2020 9.2  8.5 - 9.9 mg/dL Final    WBC 06/01/2020 05/28/2020 27  20 - 31 mEq/L Final    Anion Gap 05/28/2020 10  9 - 15 mEq/L Final    Glucose 05/28/2020 101* 70 - 99 mg/dL Final    BUN 05/28/2020 14  8 - 23 mg/dL Final    CREATININE 05/28/2020 0.53* 0.70 - 1.20 mg/dL Final    GFR Non- 05/28/2020 >60.0  >60 Final    Comment: >60 mL/min/1.73m2 EGFR, calc. for ages 25 and older using the  MDRD formula (not corrected for weight), is valid for stable  renal function.  GFR  05/28/2020 >60.0  >60 Final    Comment: >60 mL/min/1.73m2 EGFR, calc. for ages 25 and older using the  MDRD formula (not corrected for weight), is valid for stable  renal function.       Calcium 05/28/2020 10.1* 8.5 - 9.9 mg/dL Final    Total Protein 05/28/2020 7.4  6.3 - 8.0 g/dL Final    Alb 05/28/2020 3.7  3.5 - 4.6 g/dL Final    Total Bilirubin 05/28/2020 0.3  0.2 - 0.7 mg/dL Final    Alkaline Phosphatase 05/28/2020 131* 35 - 104 U/L Final    ALT 05/28/2020 12  0 - 41 U/L Final    AST 05/28/2020 11  0 - 40 U/L Final    Globulin 05/28/2020 3.7* 2.3 - 3.5 g/dL Final    Color, UA 05/28/2020 DARK YELLOW* Straw/Yellow Final    Clarity, UA 05/28/2020 Clear  Clear Final    Glucose, Ur 05/28/2020 Negative  Negative mg/dL Final    Bilirubin Urine 05/28/2020 SMALL* Negative Final    Ketones, Urine 05/28/2020 Negative  Negative mg/dL Final    Specific Gravity, UA 05/28/2020 1.036  1.005 - 1.030 Final    Blood, Urine 05/28/2020 Negative  Negative Final    pH, UA 05/28/2020 6.0  5.0 - 9.0 Final    Protein, UA 05/28/2020 Negative  Negative mg/dL Final    Urobilinogen, Urine 05/28/2020 4.0* <2.0 E.U./dL Final    Nitrite, Urine 05/28/2020 Negative  Negative Final    Leukocyte Esterase, Urine 05/28/2020 Negative  Negative Final    Urine Reflex to Culture 05/28/2020 Not Indicated   Final    Valproic Acid Lvl 05/28/2020 <2.8* 50.0 - 100.0 ug/mL Final    Phenytoin Lvl 05/28/2020 <0.8* 10.0 - 20.0 ug/mL Final    SARS-CoV-2, NAAT 05/28/2020 Not Detected

## 2020-06-09 NOTE — ADT AUTH CERT
Patient Demographics     Name Patient ID SSN Gender Identity Birth Date   Celestino Cleary [id number]  Male 56 (59 yrs)   Address Phone Email Employer    Kristy Yadav [address] (J) [phone] Betty Backer)  [address] Race Occupation Emp Status    - White - Disabled    Reg Status PCP Date Last Verified Next Review Date    Verified Lauren Stuart  [phone] 20    Admission Date Discharge Date Admitting Provider     20 Demetra Rojas,      Marital Status Scientology      Single Non-Restoration      Emergency Contact 1 Emergency Contact 2 Emergency Contact 42 [facility] (H)  [phone] (M) Shai Marielenajoycelyn. (6)  [place]  [phone] (R)  [phone] (D)  [phone] Betty Backer) Gurmeet Acharya (5)  [phone] Betty Backer)   [facility] Account [de-identified]   CVG Subscriber Name/Sex/Relation Subscriber  Subscriber Address/Phone Subscriber Emp/Emp Phone   1.  Yeison Green  [id number] Adilson Borden - Male  (Self) [address] [place] [address]  [address]  [phone](Q) DISABLED   Utilization Reviews         Medical Oncology GRG - Care Day 4 (2020) by Erin Parra RN         Review Status Review Entered   Completed 2020 16:46       Criteria Review      Care Day: 4 Care Date: 2020 Level of Care: Inpatient Floor    Guideline Day 3    Level Of Care    ( ) * Activity level acceptable    ( ) * Complete discharge planning    Clinical Status    (X) * Pain and nausea absent or adequately managed    2020 4:46 PM EDT by Jah Velasquez      No Pain meds given    (X) * Temperature status acceptable    2020 4:46 PM EDT by Jah Velasquez      98.2 (36.8)    ( ) * No infection, or status acceptable    ( ) * No neutropenia, or status acceptable    2020 4:46 PM EDT by Jah Velasquez      Last labs     ( ) * Abdominal status acceptable    ( ) * Mucositis absent or adequately resolved    ( ) * Diarrhea absent mumur ,  Rub or gallop   ABD: Non-tender. Non-distended. No masses. No organmegaly. Normal bowel sounds. No hernia. Radiology testing/Labs pertinent   Creatinine: 0.41 (L)   Glucose: 144 (H)   WBC: 13.3 (H)   RBC: 3.51 (L)   Hemoglobin Quant: 9.5 (L)   Hematocrit: 29.0 (L)   MCHC: 32.9 (L)   Platelet Count: 606 (H)   Neutrophils Absolute: 9.8 (H)   Monocytes Absolute: 1.3 (H)      Meds-name, dose, route,rate   0.9 % sodium chloride infusion    Rate: 75 mL/hr Freq: CONTINUOUS Route: IV      Scheduled Meds:   levETIRAcetam, 750 mg, Oral, BID   dexamethasone, 4 mg, Oral, 2 times per day   glycopyrrolate-formoterol, 2 puff, Inhalation, BID   divalproex, 500 mg, Oral, Daily   mirtazapine, 15 mg, Oral, Nightly          Treatment plan-attending, consults   GENERAL SURGEON   ASSESSMENT   1. Hospital day # 2   2.  Abdominal wall mass, cyst versus tumor       PLAN   1.  Biopsy of abdominal wall mass today under local anesthesia     The options of therapy were discussed and the patient agrees to the above procedure. The procedure  as well as potential risks and complications including but not exclusive to infection, blood loss, damage to surrounding structures and even requiring further surgery  were discussed. All questions are answered . The patient appears to understands and is agreeable to the surgery. 2.  Please see the consult note done on 5/29/2020 on the behavioral health unit. PULMONOLOGIST:   Patient is very lethargic.     IMPRESSION AND SUGGESTION:   1. Lung mass with metastasis   2. Brain metastasis with midline shift   3. Severe malnutrition   4. Hypercalcemia       Discussed with RN. Herberth Price is very lethargic but does wake up on and off.  Sister is coming today at 4 PM and decide about hospice care.  At this time we will continue present treatment plan. HEM-0NC   Patient is reluctant to cooperate.  He understands the seriousness of his illness and that He has probable metastatic Lung cancer and

## (undated) DEVICE — PACK,LAPAROTOMY,NO GOWNS: Brand: MEDLINE

## (undated) DEVICE — TOWEL,OR,DSP,ST,BLUE,STD,4/PK,20PK/CS: Brand: MEDLINE

## (undated) DEVICE — SUTURE VCRL + SZ 2-0 L36IN ABSRB UD L36MM CT-1 1/2 CIR VCP945H

## (undated) DEVICE — GOWN,AURORA,NONRNF,XL,30/CS: Brand: MEDLINE

## (undated) DEVICE — GLOVE ORANGE PI 8   MSG9080

## (undated) DEVICE — SUTURE MCRYL SZ 4-0 L27IN ABSRB UD L19MM PS-2 1/2 CIR PRIM Y426H

## (undated) DEVICE — SYRINGE MED 10ML TRNSLUC BRL PLUNG BLK MRK POLYPR CTRL

## (undated) DEVICE — SPONGE,LAP,18"X18",DLX,XR,ST,5/PK,40/PK: Brand: MEDLINE

## (undated) DEVICE — TRAY PREP DRY W/ PREM GLV 2 APPL 6 SPNG 2 UNDPD 1 OVERWRAP

## (undated) DEVICE — MARKER SURG SKIN GENTIAN VLT REG TIP W/ 6IN RUL

## (undated) DEVICE — COUNTER NDL 40 COUNT HLD 70 FOAM BLK ADH W/ MAG

## (undated) DEVICE — PENCIL SMK EVAC 10 FT BLADE ELECTRD ROCKER FOR TELSCP

## (undated) DEVICE — GOWN,AURORA,NONREINFORCED,LARGE: Brand: MEDLINE

## (undated) DEVICE — INTENDED FOR TISSUE SEPARATION, AND OTHER PROCEDURES THAT REQUIRE A SHARP SURGICAL BLADE TO PUNCTURE OR CUT.: Brand: BARD-PARKER ® CARBON RIB-BACK BLADES

## (undated) DEVICE — COVER LT HNDL BLU PLAS

## (undated) DEVICE — CHLORAPREP 26ML ORANGE

## (undated) DEVICE — ELECTRODE PT RET AD L9FT HI MOIST COND ADH HYDRGEL CORDED

## (undated) DEVICE — E-Z CLEAN, NON-STICK, PTFE COATED, ELECTROSURGICAL BLADE ELECTRODE, MODIFIED EXTENDED INSULATION, 2.5 INCH (6.35 CM): Brand: MEGADYNE

## (undated) DEVICE — 4-PORT MANIFOLD: Brand: NEPTUNE 2

## (undated) DEVICE — NEEDLE HYPO 25GA L1.5IN BLU POLYPR HUB S STL REG BVL STR

## (undated) DEVICE — LABEL MED MINI W/ MARKER